# Patient Record
Sex: MALE | Race: OTHER | NOT HISPANIC OR LATINO
[De-identification: names, ages, dates, MRNs, and addresses within clinical notes are randomized per-mention and may not be internally consistent; named-entity substitution may affect disease eponyms.]

---

## 2021-10-12 ENCOUNTER — APPOINTMENT (OUTPATIENT)
Dept: UROLOGY | Facility: CLINIC | Age: 61
End: 2021-10-12
Payer: COMMERCIAL

## 2021-10-12 VITALS
WEIGHT: 163 LBS | SYSTOLIC BLOOD PRESSURE: 105 MMHG | DIASTOLIC BLOOD PRESSURE: 64 MMHG | OXYGEN SATURATION: 98 % | RESPIRATION RATE: 16 BRPM | HEART RATE: 87 BPM | BODY MASS INDEX: 27.49 KG/M2 | HEIGHT: 64.57 IN | TEMPERATURE: 97.7 F

## 2021-10-12 DIAGNOSIS — R59.0 LOCALIZED ENLARGED LYMPH NODES: ICD-10-CM

## 2021-10-12 DIAGNOSIS — Z87.891 PERSONAL HISTORY OF NICOTINE DEPENDENCE: ICD-10-CM

## 2021-10-12 DIAGNOSIS — N13.5 CROSSING VESSEL AND STRICTURE OF URETER W/OUT HYDRONEPHROSIS: ICD-10-CM

## 2021-10-12 DIAGNOSIS — C34.92 MALIGNANT NEOPLASM OF UNSPECIFIED PART OF LEFT BRONCHUS OR LUNG: ICD-10-CM

## 2021-10-12 DIAGNOSIS — Z00.00 ENCOUNTER FOR GENERAL ADULT MEDICAL EXAMINATION W/OUT ABNORMAL FINDINGS: ICD-10-CM

## 2021-10-12 PROCEDURE — 99203 OFFICE O/P NEW LOW 30 MIN: CPT

## 2021-10-12 RX ORDER — OXYBUTYNIN CHLORIDE 5 MG/1
5 TABLET ORAL
Refills: 0 | Status: ACTIVE | COMMUNITY

## 2021-10-12 RX ORDER — SPIRONOLACTONE 25 MG/1
25 TABLET ORAL
Refills: 0 | Status: ACTIVE | COMMUNITY

## 2021-10-12 RX ORDER — LEVOTHYROXINE SODIUM 0.05 MG/1
50 TABLET ORAL
Refills: 0 | Status: ACTIVE | COMMUNITY

## 2021-10-12 RX ORDER — CRIZOTINIB 250 MG/1
250 CAPSULE ORAL
Refills: 0 | Status: ACTIVE | COMMUNITY

## 2021-10-12 RX ORDER — OLMESARTAN MEDOXOMIL 5 MG/1
5 TABLET, FILM COATED ORAL
Refills: 0 | Status: ACTIVE | COMMUNITY

## 2021-10-12 RX ORDER — PREDNISONE 20 MG/1
20 TABLET ORAL
Refills: 0 | Status: ACTIVE | COMMUNITY

## 2021-10-12 RX ORDER — TAMSULOSIN HYDROCHLORIDE 0.4 MG/1
0.4 CAPSULE ORAL
Refills: 0 | Status: ACTIVE | COMMUNITY

## 2021-10-12 NOTE — LETTER BODY
[FreeTextEntry1] : Otis Proctor MD\par [address] [place] [address] [address], \[place] [address]\par [phone]\par \par Dear Dr. Proctor, \par \par Reason for Visit: Left ureteral obstruction. Suprapubic edema. Proteinuria. \par \par This is a 61 year-old gentleman with a history of metastatic lung cancer presenting with proteinuria,  left ureteral obstruction and suprapubic edema. Patient is referred for evaluation of his condition. The patient previously developed left hydronephrosis secondary to a left ureteral obstruction. He subsequently underwent left stent placement with Dr. Paulino. The patient has progressive metastatic lung cancer. He complains of suprapubic edema.  His recent urinalysis demonstrated evidence of proteinuria. Patient denies any gross hematuria or dysuria or urinary incontinence. The patient denies any aggravating or relieving factors. The patient denies any interference of function. The patient is entirely asymptomatic. All other review of systems are negative. He has no cancer in his family medical history. He has previously undergone a lung biopsy. Past medical history, family history and social history were inquired and were noncontributory to current condition. The patient was a former smoker. He does not drink alcohol. Medications and allergies were reviewed. He has no known allergies to medication. \par \par On examination, the patient is a healthy-appearing gentleman in no acute distress. He is alert and oriented follows commands. He has normal mood and affect. He is normocephalic. Oral no thrush. Neck is supple. Respirations are unlabored. His abdomen is soft and nontender. Liver is nonpalpable. Bladder is nonpalpable. No CVA tenderness. Neurologically he is grossly intact. No peripheral edema. Skin without gross abnormality. He has normal male external genitalia. Normal meatus. Bilateral testes are descended intrascrotally and normal to palpation. On rectal examination, there is normal sphincter tone. The prostate is clinically benign without focal induration or nodularity.\par \par His urinalysis demonstrated evidence of proteinuria 1000 mg/mL. \par \par Assessment: Left ureteral obstruction. Suprapubic edema. Proteinuria. \par \par I counseled the patient. I discussed the various etiologies of his symptoms. In terms of his suprapubic edema, I discussed it is most likely secondary to  mediastinal lymphadenopathy from his lung cancer. In terms of his left ureteral obstruction,  I recommended the patient proceed with conservative management. In terms of his proteinuria, I recommended the patient undergo a 24 hour protein total urine test.  I encouraged the patient continue to follow up with Oncology. Patient understands that if he develops gross hematuria or any urinary discomfort, he will contact me for further evaluation. Risks and alternatives were discussed. I answered the patient questions. The patient will follow-up as directed and will contact me with any questions or concerns. Thank you for the opportunity to participate in the care of this patient. I'll keep you updated on his progress.\par \par Plan: 24 hour protein total.  Follow up as directed.

## 2021-10-12 NOTE — HISTORY OF PRESENT ILLNESS
[FreeTextEntry1] : Please refer to URO Consult note \par \par new male pt \par metastatic lung cancer \par left ureteral obstruction  \par left stent placement - dr. jacobs\par progressive cancer\par suprapubic edema \par discussed it is secondary from mediastinal lymphadenopathy from cancer \par conservative management \par see oncology \par

## 2021-10-12 NOTE — ADDENDUM
[FreeTextEntry1] : Entered by Krunal Ghosh, acting as scribe for Dr. Marbin Masters.\par \par The documentation recorded by the scribe accurately reflects the service I personally performed and the decisions made by me.

## 2022-06-28 ENCOUNTER — TRANSCRIPTION ENCOUNTER (OUTPATIENT)
Age: 62
End: 2022-06-28

## 2022-06-28 ENCOUNTER — INPATIENT (INPATIENT)
Facility: HOSPITAL | Age: 62
LOS: 2 days | Discharge: HOME HEALTH | DRG: 300 | End: 2022-07-01
Attending: INTERNAL MEDICINE | Admitting: INTERNAL MEDICINE
Payer: COMMERCIAL

## 2022-06-28 VITALS
OXYGEN SATURATION: 96 % | DIASTOLIC BLOOD PRESSURE: 80 MMHG | HEART RATE: 65 BPM | TEMPERATURE: 98 F | WEIGHT: 158.73 LBS | RESPIRATION RATE: 18 BRPM | HEIGHT: 66 IN | SYSTOLIC BLOOD PRESSURE: 125 MMHG

## 2022-06-28 DIAGNOSIS — R09.89 OTHER SPECIFIED SYMPTOMS AND SIGNS INVOLVING THE CIRCULATORY AND RESPIRATORY SYSTEMS: ICD-10-CM

## 2022-06-28 DIAGNOSIS — I82.409 ACUTE EMBOLISM AND THROMBOSIS OF UNSPECIFIED DEEP VEINS OF UNSPECIFIED LOWER EXTREMITY: ICD-10-CM

## 2022-06-28 LAB
ALBUMIN SERPL ELPH-MCNC: 3.1 G/DL — LOW (ref 3.3–5)
ALP SERPL-CCNC: 70 U/L — SIGNIFICANT CHANGE UP (ref 40–120)
ALT FLD-CCNC: 18 U/L — SIGNIFICANT CHANGE UP (ref 10–45)
ANION GAP SERPL CALC-SCNC: 13 MMOL/L — SIGNIFICANT CHANGE UP (ref 5–17)
APTT BLD: 29.3 SEC — SIGNIFICANT CHANGE UP (ref 27.5–35.5)
AST SERPL-CCNC: 32 U/L — SIGNIFICANT CHANGE UP (ref 10–40)
BASOPHILS # BLD AUTO: 0.04 K/UL — SIGNIFICANT CHANGE UP (ref 0–0.2)
BASOPHILS NFR BLD AUTO: 0.5 % — SIGNIFICANT CHANGE UP (ref 0–2)
BILIRUB SERPL-MCNC: 0.8 MG/DL — SIGNIFICANT CHANGE UP (ref 0.2–1.2)
BUN SERPL-MCNC: 22 MG/DL — SIGNIFICANT CHANGE UP (ref 7–23)
CALCIUM SERPL-MCNC: 9.2 MG/DL — SIGNIFICANT CHANGE UP (ref 8.4–10.5)
CHLORIDE SERPL-SCNC: 105 MMOL/L — SIGNIFICANT CHANGE UP (ref 96–108)
CO2 SERPL-SCNC: 21 MMOL/L — LOW (ref 22–31)
CREAT SERPL-MCNC: 0.94 MG/DL — SIGNIFICANT CHANGE UP (ref 0.5–1.3)
EGFR: 93 ML/MIN/1.73M2 — SIGNIFICANT CHANGE UP
EOSINOPHIL # BLD AUTO: 0.26 K/UL — SIGNIFICANT CHANGE UP (ref 0–0.5)
EOSINOPHIL NFR BLD AUTO: 3.4 % — SIGNIFICANT CHANGE UP (ref 0–6)
GLUCOSE SERPL-MCNC: 187 MG/DL — HIGH (ref 70–99)
HCT VFR BLD CALC: 50.3 % — HIGH (ref 39–50)
HGB BLD-MCNC: 15.9 G/DL — SIGNIFICANT CHANGE UP (ref 13–17)
IMM GRANULOCYTES NFR BLD AUTO: 0.9 % — SIGNIFICANT CHANGE UP (ref 0–1.5)
INR BLD: 1.09 RATIO — SIGNIFICANT CHANGE UP (ref 0.88–1.16)
LYMPHOCYTES # BLD AUTO: 1.36 K/UL — SIGNIFICANT CHANGE UP (ref 1–3.3)
LYMPHOCYTES # BLD AUTO: 18 % — SIGNIFICANT CHANGE UP (ref 13–44)
MCHC RBC-ENTMCNC: 29.3 PG — SIGNIFICANT CHANGE UP (ref 27–34)
MCHC RBC-ENTMCNC: 31.6 GM/DL — LOW (ref 32–36)
MCV RBC AUTO: 92.8 FL — SIGNIFICANT CHANGE UP (ref 80–100)
MONOCYTES # BLD AUTO: 0.5 K/UL — SIGNIFICANT CHANGE UP (ref 0–0.9)
MONOCYTES NFR BLD AUTO: 6.6 % — SIGNIFICANT CHANGE UP (ref 2–14)
NEUTROPHILS # BLD AUTO: 5.32 K/UL — SIGNIFICANT CHANGE UP (ref 1.8–7.4)
NEUTROPHILS NFR BLD AUTO: 70.6 % — SIGNIFICANT CHANGE UP (ref 43–77)
NRBC # BLD: 0 /100 WBCS — SIGNIFICANT CHANGE UP (ref 0–0)
PLATELET # BLD AUTO: 118 K/UL — LOW (ref 150–400)
POTASSIUM SERPL-MCNC: 4.5 MMOL/L — SIGNIFICANT CHANGE UP (ref 3.5–5.3)
POTASSIUM SERPL-SCNC: 4.5 MMOL/L — SIGNIFICANT CHANGE UP (ref 3.5–5.3)
PROT SERPL-MCNC: 6.2 G/DL — SIGNIFICANT CHANGE UP (ref 6–8.3)
PROTHROM AB SERPL-ACNC: 12.6 SEC — SIGNIFICANT CHANGE UP (ref 10.5–13.4)
RAPID RVP RESULT: SIGNIFICANT CHANGE UP
RBC # BLD: 5.42 M/UL — SIGNIFICANT CHANGE UP (ref 4.2–5.8)
RBC # FLD: 15.9 % — HIGH (ref 10.3–14.5)
SARS-COV-2 RNA SPEC QL NAA+PROBE: SIGNIFICANT CHANGE UP
SODIUM SERPL-SCNC: 139 MMOL/L — SIGNIFICANT CHANGE UP (ref 135–145)
WBC # BLD: 7.55 K/UL — SIGNIFICANT CHANGE UP (ref 3.8–10.5)
WBC # FLD AUTO: 7.55 K/UL — SIGNIFICANT CHANGE UP (ref 3.8–10.5)

## 2022-06-28 PROCEDURE — 74177 CT ABD & PELVIS W/CONTRAST: CPT | Mod: 26,MA

## 2022-06-28 PROCEDURE — 93926 LOWER EXTREMITY STUDY: CPT | Mod: 26,RT

## 2022-06-28 PROCEDURE — 73630 X-RAY EXAM OF FOOT: CPT | Mod: 26,RT

## 2022-06-28 PROCEDURE — 93010 ELECTROCARDIOGRAM REPORT: CPT | Mod: 59

## 2022-06-28 PROCEDURE — 99223 1ST HOSP IP/OBS HIGH 75: CPT

## 2022-06-28 PROCEDURE — 73590 X-RAY EXAM OF LOWER LEG: CPT | Mod: 26,RT

## 2022-06-28 PROCEDURE — 73610 X-RAY EXAM OF ANKLE: CPT | Mod: 26,RT

## 2022-06-28 PROCEDURE — 93971 EXTREMITY STUDY: CPT | Mod: 26,RT

## 2022-06-28 PROCEDURE — 99285 EMERGENCY DEPT VISIT HI MDM: CPT

## 2022-06-28 RX ORDER — MORPHINE SULFATE 50 MG/1
4 CAPSULE, EXTENDED RELEASE ORAL ONCE
Refills: 0 | Status: DISCONTINUED | OUTPATIENT
Start: 2022-06-28 | End: 2022-06-28

## 2022-06-28 RX ORDER — HEPARIN SODIUM 5000 [USP'U]/ML
6000 INJECTION INTRAVENOUS; SUBCUTANEOUS EVERY 6 HOURS
Refills: 0 | Status: DISCONTINUED | OUTPATIENT
Start: 2022-06-28 | End: 2022-06-29

## 2022-06-28 RX ORDER — ONDANSETRON 8 MG/1
4 TABLET, FILM COATED ORAL ONCE
Refills: 0 | Status: COMPLETED | OUTPATIENT
Start: 2022-06-28 | End: 2022-06-28

## 2022-06-28 RX ORDER — HEPARIN SODIUM 5000 [USP'U]/ML
6000 INJECTION INTRAVENOUS; SUBCUTANEOUS ONCE
Refills: 0 | Status: COMPLETED | OUTPATIENT
Start: 2022-06-28 | End: 2022-06-28

## 2022-06-28 RX ORDER — HEPARIN SODIUM 5000 [USP'U]/ML
INJECTION INTRAVENOUS; SUBCUTANEOUS
Qty: 25000 | Refills: 0 | Status: DISCONTINUED | OUTPATIENT
Start: 2022-06-28 | End: 2022-06-29

## 2022-06-28 RX ORDER — HEPARIN SODIUM 5000 [USP'U]/ML
3000 INJECTION INTRAVENOUS; SUBCUTANEOUS EVERY 6 HOURS
Refills: 0 | Status: DISCONTINUED | OUTPATIENT
Start: 2022-06-28 | End: 2022-06-29

## 2022-06-28 RX ADMIN — MORPHINE SULFATE 4 MILLIGRAM(S): 50 CAPSULE, EXTENDED RELEASE ORAL at 18:52

## 2022-06-28 RX ADMIN — HEPARIN SODIUM 1300 UNIT(S)/HR: 5000 INJECTION INTRAVENOUS; SUBCUTANEOUS at 20:13

## 2022-06-28 RX ADMIN — ONDANSETRON 4 MILLIGRAM(S): 8 TABLET, FILM COATED ORAL at 18:53

## 2022-06-28 RX ADMIN — HEPARIN SODIUM 6000 UNIT(S): 5000 INJECTION INTRAVENOUS; SUBCUTANEOUS at 20:13

## 2022-06-28 NOTE — H&P ADULT - ASSESSMENT
62M w/ pmhx of MET amplified/mutation adenocarcinoma of the lung, mediastinal and abdominopelvic LNs, pericardium, pleura, s/p multiple lines of therapy, currently on cabozantinib 40mg PO daily, DM2, HLD, HTN, hx of DVT p/w severe RLE edema, skin desquamation and skin color change found to have large extensive RLE DVT

## 2022-06-28 NOTE — ED PROVIDER NOTE - NS ED ATTENDING STATEMENT MOD
This was a shared visit with the SAMY. I reviewed and verified the documentation and independently performed the documented:

## 2022-06-28 NOTE — ED CLERICAL - NS ED CLERK NOTE PRE-ARRIVAL INFOMATION; PCP NAME
Dwaine TAVAREZ from [facility] [place] calling. On call is Dr. Anthony

## 2022-06-28 NOTE — ED PROVIDER NOTE - OBJECTIVE STATEMENT
62 y.o. male with PMHx of HTN, HLD, DM, and metastatic lung adenocarcinoma presents to the ED with c/o of 62 y.o. male with PMHx of HTN, HLD, DM, and metastatic lung adenocarcinoma (tx at [facility] by Sha Anthony) presents to the ED with c/o of R. lower extremity edema and pain x 3 weeks.  R. LE edema has become progressively worse over past few weeks.  Yesterday, swelling and pain became so severe that he could not ambulate independently when he typically could.  He states that his toenails were a black color yesterday but are normal in color today.  Also endorses nausea and vomiting r/t his medication regimen for cancer.  He denies any fever, chills, syncope, diarrhea, or trauma/injury to the area.

## 2022-06-28 NOTE — H&P ADULT - PROBLEM SELECTOR PLAN 3
MET amplified/mutation adenocarcinoma of the lung, mediastinal and abdominopelvic LNs, pericardium, pleura, s/p multiple lines of therapy, currently on cabozantinib 40mg PO daily. Appreciate hem/onc recommendations  -Hold Cabozantinib for now  -F/u hem/onc recommendations MET amplified/mutation adenocarcinoma of the lung, mediastinal and abdominopelvic LNs, pericardium, pleura, s/p multiple lines of therapy, currently on cabozantinib 40mg PO daily. Appreciate hem/onc recommendations  -Hold Cabozantinib for now  -F/u hem/onc recommendations  -Suspect pt's abd pain related to peritoneal carcinomatosis and possible bladder mets

## 2022-06-28 NOTE — CONSULT NOTE ADULT - ASSESSMENT
Patient under care at [facility] Dr. Sha Anthony for metastatic  MET amplified/mutation adenocarcinoma of the lung (originally diagnosed in 2013), mediastinal and abdominopelvic LNs, pericardium, pleura, s/p multiple lines of therapy, currently on cabozantinib 40mg PO daily referred to [facility] ED with severe right foot pain and swelling which started about 3 weeks ago now with inability to walk on it. Patient was given Dilaudid 1 mg IV at [facility] before being sent to ED. Patient states that his toes were a black color yesterday, but have improved in color today. He has numerous ulcers on his right foot. Patient denies fevers, chills, lightheadedness, dizziness, SOB, chest pain, N/V/D/C. Patient also has swelling and skin changes to left foot as well as right hand, but not as severe as on R foot.    Of note patient started cabozantinib about 1 week prior to foot and skin changes.    Metastatic adenocarcinoma of lung  --Under care by Dr. Sha Anthony of [facility]  --Currently on cabozantanib - would hold during admission  --Ongoing management after discharge    Painful RLE edema  --Patient with bilateral skin changes, desquamation on soles of feet but R>>L  --Also some skin breakdown palm of right hand  --Hand/foot syndrome is a known AE associated with cabozantinib  --Please obtain bilateral dopplers to rule out DVT's as well as arterial occlusion  --Patient does have a PMH of RLE DVT as well as a PMH of a LN groin dissection for   --Also please assess for possible cellulitis  --Pain management as needed    We will continue to follow patient and coordinate with [facility].    Ismael Liz PA-C  Hematology/Oncology  [facility]   [phone] (office)  [phone] (alt office)  Evenings and weekends please call MD on call or office   Patient under care at [facility] Dr. Sha Anthony for metastatic  MET amplified/mutation adenocarcinoma of the lung (originally diagnosed in 2013), mediastinal and abdominopelvic LNs, pericardium, pleura, s/p multiple lines of therapy, currently on cabozantinib 40mg PO daily referred to [facility] ED with severe right foot pain and swelling which started about 3 weeks ago now with inability to walk on it. Patient was given Dilaudid 1 mg IV at [facility] before being sent to ED. Patient states that his toes were a black color yesterday, but have improved in color today. He has numerous ulcers on his right foot. Patient denies fevers, chills, lightheadedness, dizziness, SOB, chest pain, N/V/D/C. Patient also has swelling and skin changes to left foot as well as right hand, but not as severe as on R foot.    Of note patient started cabozantinib about 1 week prior to foot and skin changes.    Metastatic adenocarcinoma of lung  --Under care by Dr. Sha Anthony of [facility]  --Currently on cabozantanib - would hold during admission  --Ongoing management after discharge    Painful RLE edema  --Patient with bilateral skin changes, desquamation on soles of feet but R>>L  --Also some skin breakdown palm of right hand  --Hand/foot syndrome is a known AE associated with cabozantinib  --Please obtain bilateral dopplers to rule out DVT's as well as arterial occlusion  --Patient does have a PMH of RLE DVT as well as a PMH of a LN groin dissection for   --Also please assess for possible cellulitis - may need ID, vascular, Derm consults  --Pain management as needed    We will continue to follow patient and coordinate with [facility].    Ismael Liz PA-C  Hematology/Oncology  [facility]   [phone] (office)  [phone] (alt office)  Evenings and weekends please call MD on call or office

## 2022-06-28 NOTE — H&P ADULT - PROBLEM SELECTOR PLAN 1
Extensive RLE DVT noted Extensive RLE DVT noted on dopplers. Soft tissue mass abutting the right common iliac likely contributing. Metastatic malignancy as well.  -Cont. heparin gtt  -Trend PTT  -Pain control, will cont. IV morphine for now  -Consider vascular consult in AM given extensive clot and inciting abutment  -Transition to DOAC when stable/ insurance coverage

## 2022-06-28 NOTE — ED PROVIDER NOTE - PHYSICAL EXAMINATION
GEN: Pt in NAD, A&O x4.  PSYCH: Affect appropriate.  HEENT: Head NCAT. eyes sclera white w/o injection. Neck supple FROM.  RESP: CTA b/l  CARDIAC: RRR, clear distinct S1, S2, no appreciable murmurs.  ABD: Abdomen soft, non-tender. No CVAT b/l.  VASC: R. LE erythema, edema - b/l lower extremity skin changes.  Sensation equal and intact in b/l lower extremities.

## 2022-06-28 NOTE — H&P ADULT - NSHPPHYSICALEXAM_GEN_ALL_CORE
Vital Signs Last 24 Hrs  T(C): 36.4 (06-28-22 @ 14:09), Max: 36.6 (06-28-22 @ 13:33)  T(F): 97.6 (06-28-22 @ 14:09), Max: 97.8 (06-28-22 @ 13:33)  HR: 70 (06-28-22 @ 20:28) (65 - 70)  BP: 115/76 (06-28-22 @ 20:28) (115/76 - 134/88)  BP(mean): --  RR: 16 (06-28-22 @ 20:28) (16 - 18)  SpO2: 100% (06-28-22 @ 20:28) (96% - 100%)

## 2022-06-28 NOTE — ED PROVIDER NOTE - CARE PLAN
1 Principal Discharge DX:	DVT, lower extremity   Principal Discharge DX:	Deep vein thrombosis of right lower extremity  Secondary Diagnosis:	Stage 4 lung cancer  Secondary Diagnosis:	Desquamated skin

## 2022-06-28 NOTE — H&P ADULT - NSHPADDITIONALINFOADULT_GEN_ALL_CORE
I was asked to see this patient by the hospitalist in charge. Dr. Damian to assume care for patient in AM and thereafter

## 2022-06-28 NOTE — H&P ADULT - HISTORY OF PRESENT ILLNESS
62M w/ pmhx of MET amplified/mutation adenocarcinoma of the lung, mediastinal and abdominopelvic LNs, pericardium, pleura, s/p multiple lines of therapy, currently on cabozantinib 40mg PO daily, DM2, HLD, HTN, hx of DVT p/w severe right foot pain and swelling which started about 3 weeks ago now with inability to walk on it. Patient was given Dilaudid 1 mg IV at [facility] before being sent to ED. Patient states that his toes were a black color yesterday and prompted visit to seek medical care but have improved in color today. He has numerous ulcers on his right foot. Patient denies fevers, chills, lightheadedness, dizziness, SOB, chest pain, N/V/D/C. Patient also has swelling and skin changes to left foot as well as right hand, but not as severe as on R foot.    Of note patient started cabozantinib about 1 week prior to foot and skin changes.    In ER: Given Heparin gtt, morphine 4mg IV, zofran 4mg IV

## 2022-06-28 NOTE — ED ADULT NURSE NOTE - ED STAT RN HANDOFF DETAILS
Report given to receiving change of shift JUAN Carlos patient is in no acute distress. Patient vital signs stable, plan of care explained.

## 2022-06-28 NOTE — ED CLERICAL - NS ED CLERK NOTE PRE-ARRIVAL INFORMATION; ADDITIONAL PRE-ARRIVAL INFORMATION
CC/Reason For referral: right foot swelling and pain.  Hx Lung Cancer and taking Cabozatinib PO  Preferred Consultant(if applicable):  Who admits for you (if needed):  Do you have documents you would like to fax over?  Would you still like to speak to an ED attending? No

## 2022-06-28 NOTE — CHART NOTE - NSCHARTNOTEFT_GEN_A_CORE
Patient under care at [facility] for metastatic lung cancer currently on cabozatinib 40mg PO daily referred to [facility] ED with severe right foot pain and swelling with inability to walk on it. Patient was given Dilaudid 1 mg IV at [facility] before being sent to ED. Patient will need RLE doppler to rule out clot vs severe hand/foot syndrome from meds.    Records being sent from [facility]. Full consult to follow once assessed.    If admitted to medicine please admit to Dr. Jaswinder Damian who is aware of patient.    Thank you.  Ismael Liz PA-C  Hematology/Oncology  [facility]   [phone] (office)  [phone] (alt office)  Evenings and weekends please call MD on call or office

## 2022-06-28 NOTE — H&P ADULT - PROBLEM SELECTOR PLAN 6
-Trend BP  -Cont. torsemide BID for now -Trend BP  -Cont. torsemide BID for now  -On propranolol at home

## 2022-06-28 NOTE — H&P ADULT - PROBLEM SELECTOR PLAN 2
Thought to be possible side effect to cabozantinib  -Cont. to hold cabozantinib for now  -F/u hem/onc recommendations

## 2022-06-28 NOTE — ED PROVIDER NOTE - ATTENDING APP SHARED VISIT CONTRIBUTION OF CARE
Attending Statement (JADE Hummel MD):    HPI: 61 y/o M with h/o metastatic lung adenocarcinoma, on chemotherapy, as well as h/o HTN HLD DM, presents to the ED for evaluation of right leg swelling pain and ulcerations for the past 3 weeks.  Has had ulcers (described as flaking off of skin) in both feet with his chemotherapy but RLE is worse, and 3 weeks ago developed increased swelling in the right leg; also notes that intermittently his toenails turn black, but is not having this symptoms at present. No numbness.     Review of Systems:  -General: no fever  -ENT: no congestion, no difficulty swallowing  -Pulmonary: no cough, no shortness of breath  -Cardiac: no chest pain, no palpitations  -Gastrointestinal: no abdominal pain, no nausea, no vomiting, and no diarrhea.  -Genitourinary: no blood or pain with urination  -Musculoskeletal: no back or neck pain  -Skin: no rashes  -Endocrine: +h/o diabetes  -Neurologic: No new weakness or numbness in extremities    All else negative unless otherwise specified elsewhere in this note.    PSH/PMH as noted above    On Physical Exam:  General: well appearing, in NAD, speaking clearly in full sentences and without difficulty; cooperative with exam  HEENT: anicteric sclera, airway patent  Neck: no JVD  Cardiac: s1s2  Lungs: CTABL  Abdomen: soft nontender/nondistended  : no bladder tenderness or distension  Skin: intact, no rash  Extremities: b/l desquamation in both feet on plantar surface, no jamilah ulcerations or areas of necrotic tissue.  RLE with diffuse pitting edema and discoloration (darkening/hyperpigmentation) of skin, but not cyanotic. b/l LE warm/well-perfused, dp/pt pulses palpable b/l, sensation intact to touch in both LE, and soft compartments are present  Neuro: no gross neurologic deficits    MDM: RLE with venous stasis changes and b/l LE with desquamation (likely 2/2 chemotx); not overtly consistent with cellulitis/infection on my exam; will need screening labs: cbc (to evaluate for leukocytosis or anemia), CMP (to evaluate for electrolyte abnormalities or renal/liver dysfunction) and coag panel, and Ultrasound of the RLE to evaluate for DVT; if DVT present, suspect need for admission given complex medical comorbidities to start AC.

## 2022-06-28 NOTE — ED ADULT TRIAGE NOTE - CHIEF COMPLAINT QUOTE
RLE swelling and pain, sent from [facility] with 20 g IV  present in R. forearm with mediport to left chest- not currently accessed

## 2022-06-28 NOTE — H&P ADULT - NSICDXPASTMEDICALHX_GEN_ALL_CORE_FT
PAST MEDICAL HISTORY:  DM (diabetes mellitus)     HLD (hyperlipidemia)     HTN (hypertension)      PAST MEDICAL HISTORY:  DM (diabetes mellitus)     DVT, lower extremity     HLD (hyperlipidemia)     HTN (hypertension)     Stage 4 lung cancer

## 2022-06-28 NOTE — ED ADULT NURSE NOTE - OBJECTIVE STATEMENT
61yo M aaox4 h/o Dm2, metastatic lung CA , [place] [facility] c/o R leg swelling/pain, as per pt about 2 weeks started with he swelling, worsening yesterday , pt reports that cant walk for the pain, pain is on the R calf, pt reports that he has taking chemo Po  medication, but Md stopped the medications for the side effects  . [place] [facility] [place] 20[place]G on the R AC, On examinations pt have Mediport on the L upper chest as pe rpt " Its not working" , also c/o burning urinations for 2 months , getting better lately. Pt denies CP, SOB, HA, vision changes, n/v/d, fevers chills, abdominal pain. Safety and comfort measures initiated- bed placed in lowest position and side rails raised. Pt oriented to call bell system.

## 2022-06-29 DIAGNOSIS — Z29.9 ENCOUNTER FOR PROPHYLACTIC MEASURES, UNSPECIFIED: ICD-10-CM

## 2022-06-29 DIAGNOSIS — R23.4 CHANGES IN SKIN TEXTURE: ICD-10-CM

## 2022-06-29 DIAGNOSIS — I10 ESSENTIAL (PRIMARY) HYPERTENSION: ICD-10-CM

## 2022-06-29 DIAGNOSIS — E11.9 TYPE 2 DIABETES MELLITUS WITHOUT COMPLICATIONS: ICD-10-CM

## 2022-06-29 DIAGNOSIS — C34.90 MALIGNANT NEOPLASM OF UNSPECIFIED PART OF UNSPECIFIED BRONCHUS OR LUNG: ICD-10-CM

## 2022-06-29 DIAGNOSIS — I82.409 ACUTE EMBOLISM AND THROMBOSIS OF UNSPECIFIED DEEP VEINS OF UNSPECIFIED LOWER EXTREMITY: ICD-10-CM

## 2022-06-29 DIAGNOSIS — E78.5 HYPERLIPIDEMIA, UNSPECIFIED: ICD-10-CM

## 2022-06-29 LAB
A1C WITH ESTIMATED AVERAGE GLUCOSE RESULT: 7.5 % — HIGH (ref 4–5.6)
ALBUMIN SERPL ELPH-MCNC: 2.9 G/DL — LOW (ref 3.3–5)
ALP SERPL-CCNC: 60 U/L — SIGNIFICANT CHANGE UP (ref 40–120)
ALT FLD-CCNC: 15 U/L — SIGNIFICANT CHANGE UP (ref 10–45)
ANION GAP SERPL CALC-SCNC: 12 MMOL/L — SIGNIFICANT CHANGE UP (ref 5–17)
APTT BLD: 157.6 SEC — CRITICAL HIGH (ref 27.5–35.5)
APTT BLD: 89.3 SEC — HIGH (ref 27.5–35.5)
APTT BLD: >200 SEC — CRITICAL HIGH (ref 27.5–35.5)
AST SERPL-CCNC: 25 U/L — SIGNIFICANT CHANGE UP (ref 10–40)
BASOPHILS # BLD AUTO: 0.03 K/UL — SIGNIFICANT CHANGE UP (ref 0–0.2)
BASOPHILS NFR BLD AUTO: 0.5 % — SIGNIFICANT CHANGE UP (ref 0–2)
BILIRUB SERPL-MCNC: 0.5 MG/DL — SIGNIFICANT CHANGE UP (ref 0.2–1.2)
BUN SERPL-MCNC: 24 MG/DL — HIGH (ref 7–23)
CALCIUM SERPL-MCNC: 8.8 MG/DL — SIGNIFICANT CHANGE UP (ref 8.4–10.5)
CHLORIDE SERPL-SCNC: 103 MMOL/L — SIGNIFICANT CHANGE UP (ref 96–108)
CO2 SERPL-SCNC: 23 MMOL/L — SIGNIFICANT CHANGE UP (ref 22–31)
CREAT SERPL-MCNC: 1.11 MG/DL — SIGNIFICANT CHANGE UP (ref 0.5–1.3)
EGFR: 76 ML/MIN/1.73M2 — SIGNIFICANT CHANGE UP
EOSINOPHIL # BLD AUTO: 0.12 K/UL — SIGNIFICANT CHANGE UP (ref 0–0.5)
EOSINOPHIL NFR BLD AUTO: 2.1 % — SIGNIFICANT CHANGE UP (ref 0–6)
ESTIMATED AVERAGE GLUCOSE: 169 MG/DL — HIGH (ref 68–114)
GLUCOSE BLDC GLUCOMTR-MCNC: 157 MG/DL — HIGH (ref 70–99)
GLUCOSE BLDC GLUCOMTR-MCNC: 176 MG/DL — HIGH (ref 70–99)
GLUCOSE BLDC GLUCOMTR-MCNC: 177 MG/DL — HIGH (ref 70–99)
GLUCOSE BLDC GLUCOMTR-MCNC: 190 MG/DL — HIGH (ref 70–99)
GLUCOSE BLDC GLUCOMTR-MCNC: 249 MG/DL — HIGH (ref 70–99)
GLUCOSE SERPL-MCNC: 272 MG/DL — HIGH (ref 70–99)
HCT VFR BLD CALC: 45.5 % — SIGNIFICANT CHANGE UP (ref 39–50)
HCT VFR BLD CALC: 47.9 % — SIGNIFICANT CHANGE UP (ref 39–50)
HGB BLD-MCNC: 14.7 G/DL — SIGNIFICANT CHANGE UP (ref 13–17)
HGB BLD-MCNC: 15.1 G/DL — SIGNIFICANT CHANGE UP (ref 13–17)
IMM GRANULOCYTES NFR BLD AUTO: 0.5 % — SIGNIFICANT CHANGE UP (ref 0–1.5)
LYMPHOCYTES # BLD AUTO: 1.48 K/UL — SIGNIFICANT CHANGE UP (ref 1–3.3)
LYMPHOCYTES # BLD AUTO: 26.3 % — SIGNIFICANT CHANGE UP (ref 13–44)
MAGNESIUM SERPL-MCNC: 1.9 MG/DL — SIGNIFICANT CHANGE UP (ref 1.6–2.6)
MCHC RBC-ENTMCNC: 29.2 PG — SIGNIFICANT CHANGE UP (ref 27–34)
MCHC RBC-ENTMCNC: 29.9 PG — SIGNIFICANT CHANGE UP (ref 27–34)
MCHC RBC-ENTMCNC: 31.5 GM/DL — LOW (ref 32–36)
MCHC RBC-ENTMCNC: 32.3 GM/DL — SIGNIFICANT CHANGE UP (ref 32–36)
MCV RBC AUTO: 92.5 FL — SIGNIFICANT CHANGE UP (ref 80–100)
MCV RBC AUTO: 92.7 FL — SIGNIFICANT CHANGE UP (ref 80–100)
MONOCYTES # BLD AUTO: 0.45 K/UL — SIGNIFICANT CHANGE UP (ref 0–0.9)
MONOCYTES NFR BLD AUTO: 8 % — SIGNIFICANT CHANGE UP (ref 2–14)
NEUTROPHILS # BLD AUTO: 3.51 K/UL — SIGNIFICANT CHANGE UP (ref 1.8–7.4)
NEUTROPHILS NFR BLD AUTO: 62.6 % — SIGNIFICANT CHANGE UP (ref 43–77)
NRBC # BLD: 0 /100 WBCS — SIGNIFICANT CHANGE UP (ref 0–0)
NRBC # BLD: 0 /100 WBCS — SIGNIFICANT CHANGE UP (ref 0–0)
PLATELET # BLD AUTO: 116 K/UL — LOW (ref 150–400)
PLATELET # BLD AUTO: 118 K/UL — LOW (ref 150–400)
POTASSIUM SERPL-MCNC: 3.8 MMOL/L — SIGNIFICANT CHANGE UP (ref 3.5–5.3)
POTASSIUM SERPL-SCNC: 3.8 MMOL/L — SIGNIFICANT CHANGE UP (ref 3.5–5.3)
PROT SERPL-MCNC: 5.7 G/DL — LOW (ref 6–8.3)
RBC # BLD: 4.91 M/UL — SIGNIFICANT CHANGE UP (ref 4.2–5.8)
RBC # BLD: 5.18 M/UL — SIGNIFICANT CHANGE UP (ref 4.2–5.8)
RBC # FLD: 15.8 % — HIGH (ref 10.3–14.5)
RBC # FLD: 15.9 % — HIGH (ref 10.3–14.5)
SODIUM SERPL-SCNC: 138 MMOL/L — SIGNIFICANT CHANGE UP (ref 135–145)
WBC # BLD: 5.62 K/UL — SIGNIFICANT CHANGE UP (ref 3.8–10.5)
WBC # BLD: 6.29 K/UL — SIGNIFICANT CHANGE UP (ref 3.8–10.5)
WBC # FLD AUTO: 5.62 K/UL — SIGNIFICANT CHANGE UP (ref 3.8–10.5)
WBC # FLD AUTO: 6.29 K/UL — SIGNIFICANT CHANGE UP (ref 3.8–10.5)

## 2022-06-29 PROCEDURE — 93010 ELECTROCARDIOGRAM REPORT: CPT

## 2022-06-29 RX ORDER — DEXTROSE 50 % IN WATER 50 %
25 SYRINGE (ML) INTRAVENOUS ONCE
Refills: 0 | Status: DISCONTINUED | OUTPATIENT
Start: 2022-06-29 | End: 2022-07-01

## 2022-06-29 RX ORDER — METFORMIN HYDROCHLORIDE 850 MG/1
4 TABLET ORAL
Qty: 0 | Refills: 0 | DISCHARGE

## 2022-06-29 RX ORDER — PANTOPRAZOLE SODIUM 20 MG/1
40 TABLET, DELAYED RELEASE ORAL
Refills: 0 | Status: DISCONTINUED | OUTPATIENT
Start: 2022-06-29 | End: 2022-07-01

## 2022-06-29 RX ORDER — SODIUM CHLORIDE 9 MG/ML
1000 INJECTION, SOLUTION INTRAVENOUS
Refills: 0 | Status: DISCONTINUED | OUTPATIENT
Start: 2022-06-29 | End: 2022-07-01

## 2022-06-29 RX ORDER — ACETAMINOPHEN 500 MG
650 TABLET ORAL EVERY 6 HOURS
Refills: 0 | Status: DISCONTINUED | OUTPATIENT
Start: 2022-06-29 | End: 2022-07-01

## 2022-06-29 RX ORDER — ONDANSETRON 8 MG/1
4 TABLET, FILM COATED ORAL EVERY 8 HOURS
Refills: 0 | Status: DISCONTINUED | OUTPATIENT
Start: 2022-06-29 | End: 2022-07-01

## 2022-06-29 RX ORDER — FENOFIBRATE,MICRONIZED 130 MG
145 CAPSULE ORAL DAILY
Refills: 0 | Status: DISCONTINUED | OUTPATIENT
Start: 2022-06-29 | End: 2022-07-01

## 2022-06-29 RX ORDER — LOSARTAN POTASSIUM 100 MG/1
25 TABLET, FILM COATED ORAL DAILY
Refills: 0 | Status: DISCONTINUED | OUTPATIENT
Start: 2022-06-29 | End: 2022-07-01

## 2022-06-29 RX ORDER — ATORVASTATIN CALCIUM 80 MG/1
10 TABLET, FILM COATED ORAL AT BEDTIME
Refills: 0 | Status: DISCONTINUED | OUTPATIENT
Start: 2022-06-29 | End: 2022-07-01

## 2022-06-29 RX ORDER — LOSARTAN POTASSIUM 100 MG/1
1 TABLET, FILM COATED ORAL
Qty: 0 | Refills: 0 | DISCHARGE

## 2022-06-29 RX ORDER — OMEPRAZOLE 10 MG/1
1 CAPSULE, DELAYED RELEASE ORAL
Qty: 0 | Refills: 0 | DISCHARGE

## 2022-06-29 RX ORDER — HEPARIN SODIUM 5000 [USP'U]/ML
6000 INJECTION INTRAVENOUS; SUBCUTANEOUS EVERY 6 HOURS
Refills: 0 | Status: DISCONTINUED | OUTPATIENT
Start: 2022-06-29 | End: 2022-06-30

## 2022-06-29 RX ORDER — POLYETHYLENE GLYCOL 3350 17 G/17G
17 POWDER, FOR SOLUTION ORAL DAILY
Refills: 0 | Status: DISCONTINUED | OUTPATIENT
Start: 2022-06-29 | End: 2022-07-01

## 2022-06-29 RX ORDER — HEPARIN SODIUM 5000 [USP'U]/ML
1100 INJECTION INTRAVENOUS; SUBCUTANEOUS
Qty: 25000 | Refills: 0 | Status: DISCONTINUED | OUTPATIENT
Start: 2022-06-29 | End: 2022-06-30

## 2022-06-29 RX ORDER — ACETAMINOPHEN 500 MG
650 TABLET ORAL EVERY 6 HOURS
Refills: 0 | Status: DISCONTINUED | OUTPATIENT
Start: 2022-06-29 | End: 2022-06-29

## 2022-06-29 RX ORDER — LANOLIN ALCOHOL/MO/W.PET/CERES
3 CREAM (GRAM) TOPICAL AT BEDTIME
Refills: 0 | Status: DISCONTINUED | OUTPATIENT
Start: 2022-06-29 | End: 2022-07-01

## 2022-06-29 RX ORDER — FOLIC ACID 0.8 MG
1 TABLET ORAL DAILY
Refills: 0 | Status: DISCONTINUED | OUTPATIENT
Start: 2022-06-29 | End: 2022-07-01

## 2022-06-29 RX ORDER — GLUCAGON INJECTION, SOLUTION 0.5 MG/.1ML
1 INJECTION, SOLUTION SUBCUTANEOUS ONCE
Refills: 0 | Status: DISCONTINUED | OUTPATIENT
Start: 2022-06-29 | End: 2022-07-01

## 2022-06-29 RX ORDER — FOLIC ACID 0.8 MG
1 TABLET ORAL
Qty: 0 | Refills: 0 | DISCHARGE

## 2022-06-29 RX ORDER — FENOFIBRATE,MICRONIZED 130 MG
1 CAPSULE ORAL
Qty: 0 | Refills: 0 | DISCHARGE

## 2022-06-29 RX ORDER — MORPHINE SULFATE 50 MG/1
4 CAPSULE, EXTENDED RELEASE ORAL EVERY 6 HOURS
Refills: 0 | Status: DISCONTINUED | OUTPATIENT
Start: 2022-06-29 | End: 2022-07-01

## 2022-06-29 RX ORDER — LANOLIN ALCOHOL/MO/W.PET/CERES
5 CREAM (GRAM) TOPICAL AT BEDTIME
Refills: 0 | Status: DISCONTINUED | OUTPATIENT
Start: 2022-06-29 | End: 2022-07-01

## 2022-06-29 RX ORDER — SENNA PLUS 8.6 MG/1
2 TABLET ORAL AT BEDTIME
Refills: 0 | Status: DISCONTINUED | OUTPATIENT
Start: 2022-06-29 | End: 2022-07-01

## 2022-06-29 RX ORDER — INSULIN LISPRO 100/ML
VIAL (ML) SUBCUTANEOUS
Refills: 0 | Status: DISCONTINUED | OUTPATIENT
Start: 2022-06-29 | End: 2022-07-01

## 2022-06-29 RX ORDER — OXYCODONE HYDROCHLORIDE 5 MG/1
5 TABLET ORAL EVERY 6 HOURS
Refills: 0 | Status: DISCONTINUED | OUTPATIENT
Start: 2022-06-29 | End: 2022-07-01

## 2022-06-29 RX ORDER — DEXTROSE 50 % IN WATER 50 %
12.5 SYRINGE (ML) INTRAVENOUS ONCE
Refills: 0 | Status: DISCONTINUED | OUTPATIENT
Start: 2022-06-29 | End: 2022-07-01

## 2022-06-29 RX ORDER — DEXTROSE 50 % IN WATER 50 %
15 SYRINGE (ML) INTRAVENOUS ONCE
Refills: 0 | Status: DISCONTINUED | OUTPATIENT
Start: 2022-06-29 | End: 2022-07-01

## 2022-06-29 RX ORDER — HEPARIN SODIUM 5000 [USP'U]/ML
3000 INJECTION INTRAVENOUS; SUBCUTANEOUS EVERY 6 HOURS
Refills: 0 | Status: DISCONTINUED | OUTPATIENT
Start: 2022-06-29 | End: 2022-06-30

## 2022-06-29 RX ADMIN — MORPHINE SULFATE 4 MILLIGRAM(S): 50 CAPSULE, EXTENDED RELEASE ORAL at 01:03

## 2022-06-29 RX ADMIN — Medication 650 MILLIGRAM(S): at 06:00

## 2022-06-29 RX ADMIN — LOSARTAN POTASSIUM 25 MILLIGRAM(S): 100 TABLET, FILM COATED ORAL at 05:20

## 2022-06-29 RX ADMIN — Medication 145 MILLIGRAM(S): at 13:59

## 2022-06-29 RX ADMIN — Medication 1 MILLIGRAM(S): at 11:31

## 2022-06-29 RX ADMIN — OXYCODONE HYDROCHLORIDE 5 MILLIGRAM(S): 5 TABLET ORAL at 19:10

## 2022-06-29 RX ADMIN — HEPARIN SODIUM 0 UNIT(S)/HR: 5000 INJECTION INTRAVENOUS; SUBCUTANEOUS at 03:58

## 2022-06-29 RX ADMIN — HEPARIN SODIUM 1300 UNIT(S)/HR: 5000 INJECTION INTRAVENOUS; SUBCUTANEOUS at 01:03

## 2022-06-29 RX ADMIN — Medication 1: at 18:09

## 2022-06-29 RX ADMIN — ATORVASTATIN CALCIUM 10 MILLIGRAM(S): 80 TABLET, FILM COATED ORAL at 21:13

## 2022-06-29 RX ADMIN — HEPARIN SODIUM 900 UNIT(S)/HR: 5000 INJECTION INTRAVENOUS; SUBCUTANEOUS at 13:38

## 2022-06-29 RX ADMIN — HEPARIN SODIUM 0 UNIT(S)/HR: 5000 INJECTION INTRAVENOUS; SUBCUTANEOUS at 12:26

## 2022-06-29 RX ADMIN — Medication 650 MILLIGRAM(S): at 05:18

## 2022-06-29 RX ADMIN — Medication 5 MILLIGRAM(S): at 21:13

## 2022-06-29 RX ADMIN — OXYCODONE HYDROCHLORIDE 5 MILLIGRAM(S): 5 TABLET ORAL at 18:10

## 2022-06-29 RX ADMIN — PANTOPRAZOLE SODIUM 40 MILLIGRAM(S): 20 TABLET, DELAYED RELEASE ORAL at 05:19

## 2022-06-29 RX ADMIN — Medication 5 MILLIGRAM(S): at 01:03

## 2022-06-29 RX ADMIN — Medication 650 MILLIGRAM(S): at 19:10

## 2022-06-29 RX ADMIN — Medication 650 MILLIGRAM(S): at 12:30

## 2022-06-29 RX ADMIN — Medication 650 MILLIGRAM(S): at 11:31

## 2022-06-29 RX ADMIN — Medication 2: at 12:27

## 2022-06-29 RX ADMIN — Medication 20 MILLIGRAM(S): at 13:59

## 2022-06-29 RX ADMIN — HEPARIN SODIUM 1100 UNIT(S)/HR: 5000 INJECTION INTRAVENOUS; SUBCUTANEOUS at 05:15

## 2022-06-29 RX ADMIN — HEPARIN SODIUM 900 UNIT(S)/HR: 5000 INJECTION INTRAVENOUS; SUBCUTANEOUS at 20:27

## 2022-06-29 RX ADMIN — Medication 1: at 08:52

## 2022-06-29 RX ADMIN — MORPHINE SULFATE 4 MILLIGRAM(S): 50 CAPSULE, EXTENDED RELEASE ORAL at 01:20

## 2022-06-29 RX ADMIN — SENNA PLUS 2 TABLET(S): 8.6 TABLET ORAL at 21:13

## 2022-06-29 RX ADMIN — Medication 650 MILLIGRAM(S): at 18:10

## 2022-06-29 RX ADMIN — Medication 20 MILLIGRAM(S): at 05:19

## 2022-06-29 RX ADMIN — POLYETHYLENE GLYCOL 3350 17 GRAM(S): 17 POWDER, FOR SOLUTION ORAL at 11:31

## 2022-06-29 NOTE — CONSULT NOTE ADULT - SUBJECTIVE AND OBJECTIVE BOX
Reason for consult: Non-small cell lung cancer    HPI: Patient under care at [facility] Dr. Sha Anthony for metastatic  MET amplified/mutation adenocarcinoma of the lung, mediastinal and abdominopelvic LNs, pericardium, pleura, s/p multiple lines of therapy, currently on cabozantinib 40mg PO daily referred to [facility] ED with severe right foot pain and swelling which started about 3 weeks ago now with inability to walk on it. Patient was given Dilaudid 1 mg IV at [facility] before being sent to ED. Patient states that his toes were a black color yesterday, but have improved in color today. He has numerous ulcers on his right foot. Patient denies fevers, chills, lightheadedness, dizziness, SOB, chest pain, N/V/D/C. Patient also has swelling and skin changes to left foot as well as right hand, but not as severe as on R foot.    Of note patient started cabozantinib about 1 week prior to foot and skin changes.    Patient will need RLE doppler to rule out clot vs severe hand/foot syndrome from meds.        PAST MEDICAL & SURGICAL HISTORY:  HTN (hypertension)      HLD (hyperlipidemia)      DM (diabetes mellitus)          FAMILY HISTORY:      Alcohol Denied  Smoking: Nonsmoker  Drug Use: Denied  Marital Status:         Allergies    Celebrex (Rash)    Intolerances        MEDICATIONS  (STANDING):    MEDICATIONS  (PRN):      ROS  No fever, sweats, chills  No epistaxis, HA, sore throat  No CP, SOB, cough, sputum  No n/v/d, abd pain, melena, hematochezia  No anxiety  Pain, skin changes both feet R>L per HPI  No bleeding, bruising  No dysuria, hematuria    T(C): 36.4 (06-28-22 @ 14:09), Max: 36.6 (06-28-22 @ 13:33)  HR: 65 (06-28-22 @ 14:09) (65 - 65)  BP: 132/98 (06-28-22 @ 14:09) (125/80 - 132/98)  RR: 18 (06-28-22 @ 14:09) (18 - 18)  SpO2: 97% (06-28-22 @ 14:09) (96% - 97%)  Wt(kg): --    PE  NAD  Awake, alert  Anicteric, MMM  RRR  CTAB  Abd soft, NT, ND  Bilateral pedal edema R>L  RLE slightly cool to touch  Rash, skin desquamation both feet at soles and palm of right hand                
HPI:  62M w/ pmhx of MET amplified/mutation adenocarcinoma of the lung, mediastinal and abdominopelvic LNs, pericardium, pleura, s/p multiple lines of therapy, currently on cabozantinib 40mg PO daily, DM2, HLD, HTN, hx of DVT p/w severe right foot pain and swelling which started about 3 weeks ago now with inability to walk on it. Patient was given Dilaudid 1 mg IV at [facility] before being sent to ED. Patient states that his toes were a black color yesterday and prompted visit to seek medical care but have improved in color today. He has numerous ulcers on his right foot. Patient denies fevers, chills, lightheadedness, dizziness, SOB, chest pain, N/V/D/C. Patient also has swelling and skin changes to left foot as well as right hand, but not as severe as on R foot.    Of note patient started cabozantinib about 1 week prior to foot and skin changes.    In ER: Given Heparin gtt, morphine 4mg IV, zofran 4mg IV (28 Jun 2022 23:50)    Patient is a 62y old  Male who presents with a chief complaint of RLE pain and edema (29 Jun 2022 14:30)    Allergies    Celebrex (Rash)    Intolerances      Vital Signs Last 24 Hrs  T(C): 36.4 (29 Jun 2022 14:21), Max: 36.8 (29 Jun 2022 00:11)  T(F): 97.6 (29 Jun 2022 14:21), Max: 98.3 (29 Jun 2022 00:11)  HR: 66 (29 Jun 2022 14:21) (66 - 75)  BP: 112/74 (29 Jun 2022 14:21) (102/70 - 135/85)  BP(mean): --  RR: 18 (29 Jun 2022 14:21) (16 - 18)  SpO2: 99% (29 Jun 2022 14:21) (98% - 100%)                        14.7   5.62  )-----------( 118      ( 29 Jun 2022 11:25 )             45.5     06-29    138  |  103  |  24<H>  ----------------------------<  272<H>  3.8   |  23  |  1.11    Ca    8.8      29 Jun 2022 11:26  Mg     1.9     06-29    TPro  5.7<L>  /  Alb  2.9<L>  /  TBili  0.5  /  DBili  x   /  AST  25  /  ALT  15  /  AlkPhos  60  06-29    CAPILLARY BLOOD GLUCOSE      POCT Blood Glucose.: 249 mg/dL (29 Jun 2022 12:21)    MEDICATIONS  (STANDING):  acetaminophen     Tablet .. 650 milliGRAM(s) Oral every 6 hours  atorvastatin 10 milliGRAM(s) Oral at bedtime  dextrose 5%. 1000 milliLiter(s) (100 mL/Hr) IV Continuous <Continuous>  dextrose 5%. 1000 milliLiter(s) (50 mL/Hr) IV Continuous <Continuous>  dextrose 50% Injectable 25 Gram(s) IV Push once  dextrose 50% Injectable 12.5 Gram(s) IV Push once  dextrose 50% Injectable 25 Gram(s) IV Push once  fenofibrate Tablet 145 milliGRAM(s) Oral daily  folic acid 1 milliGRAM(s) Oral daily  glucagon  Injectable 1 milliGRAM(s) IntraMuscular once  heparin  Infusion. 1100 Unit(s)/Hr (11 mL/Hr) IV Continuous <Continuous>  insulin lispro (ADMELOG) corrective regimen sliding scale   SubCutaneous three times a day before meals  losartan 25 milliGRAM(s) Oral daily  melatonin 5 milliGRAM(s) Oral at bedtime  pantoprazole    Tablet 40 milliGRAM(s) Oral before breakfast  polyethylene glycol 3350 17 Gram(s) Oral daily  senna 2 Tablet(s) Oral at bedtime  torsemide 20 milliGRAM(s) Oral two times a day    MEDICATIONS  (PRN):  dextrose Oral Gel 15 Gram(s) Oral once PRN Blood Glucose LESS THAN 70 milliGRAM(s)/deciliter  heparin   Injectable 6000 Unit(s) IV Push every 6 hours PRN For aPTT less than 40  heparin   Injectable 3000 Unit(s) IV Push every 6 hours PRN For aPTT between 40 - 57  melatonin 3 milliGRAM(s) Oral at bedtime PRN Insomnia  morphine  - Injectable 4 milliGRAM(s) IV Push every 6 hours PRN Severe Pain (7 - 10)  ondansetron Injectable 4 milliGRAM(s) IV Push every 8 hours PRN Nausea and/or Vomiting  oxyCODONE    IR 5 milliGRAM(s) Oral every 6 hours PRN Moderate Pain (4 - 6)    PAST MEDICAL & SURGICAL HISTORY:  HTN (hypertension)      HLD (hyperlipidemia)      DM (diabetes mellitus)      Stage 4 lung cancer      DVT, lower extremity      No significant past surgical history      BRETT:    Pedal pulses palp 2/4 bl, cap fill instant to all toes    Redness and scaling lesions in a moccasin like distribution POP to lateral lesions.  No drainage.  No open lesions.  No purulence or local signs of infection.     No gross deformities    +edema, RLE, POP R calf

## 2022-06-29 NOTE — PATIENT PROFILE ADULT - FALL HARM RISK - HARM RISK INTERVENTIONS

## 2022-06-29 NOTE — PROVIDER CONTACT NOTE (CRITICAL VALUE NOTIFICATION) - ASSESSMENT
pt alert & oriented
pt A&Ox4, no acute distress noted, v/s stable, afebrile, denies any pain, no acute bleeding noted

## 2022-06-29 NOTE — CONSULT NOTE ADULT - ASSESSMENT
62 year old male with RLE DVT and tinea pedis, bl feet  -pt evlauated, chart reviewed  -med management for DVT  -Rx lotrisone cream to apply twice daily to skin lesions, right and left foot  -Pod stable for DC once stable per medicine  -fu within 2 weeks of DC    Call for appointment   [phone]

## 2022-06-30 ENCOUNTER — TRANSCRIPTION ENCOUNTER (OUTPATIENT)
Age: 62
End: 2022-06-30

## 2022-06-30 LAB
APTT BLD: 103.9 SEC — HIGH (ref 27.5–35.5)
APTT BLD: 67.9 SEC — HIGH (ref 27.5–35.5)
GLUCOSE BLDC GLUCOMTR-MCNC: 138 MG/DL — HIGH (ref 70–99)
GLUCOSE BLDC GLUCOMTR-MCNC: 157 MG/DL — HIGH (ref 70–99)
GLUCOSE BLDC GLUCOMTR-MCNC: 202 MG/DL — HIGH (ref 70–99)
GLUCOSE BLDC GLUCOMTR-MCNC: 203 MG/DL — HIGH (ref 70–99)
HCT VFR BLD CALC: 46.3 % — SIGNIFICANT CHANGE UP (ref 39–50)
HCT VFR BLD CALC: 46.9 % — SIGNIFICANT CHANGE UP (ref 39–50)
HGB BLD-MCNC: 14.8 G/DL — SIGNIFICANT CHANGE UP (ref 13–17)
HGB BLD-MCNC: 15.1 G/DL — SIGNIFICANT CHANGE UP (ref 13–17)
MCHC RBC-ENTMCNC: 29.5 PG — SIGNIFICANT CHANGE UP (ref 27–34)
MCHC RBC-ENTMCNC: 30 PG — SIGNIFICANT CHANGE UP (ref 27–34)
MCHC RBC-ENTMCNC: 32 GM/DL — SIGNIFICANT CHANGE UP (ref 32–36)
MCHC RBC-ENTMCNC: 32.2 GM/DL — SIGNIFICANT CHANGE UP (ref 32–36)
MCV RBC AUTO: 91.8 FL — SIGNIFICANT CHANGE UP (ref 80–100)
MCV RBC AUTO: 93.7 FL — SIGNIFICANT CHANGE UP (ref 80–100)
NRBC # BLD: 0 /100 WBCS — SIGNIFICANT CHANGE UP (ref 0–0)
NRBC # BLD: 0 /100 WBCS — SIGNIFICANT CHANGE UP (ref 0–0)
PLATELET # BLD AUTO: 117 K/UL — LOW (ref 150–400)
PLATELET # BLD AUTO: 140 K/UL — LOW (ref 150–400)
RBC # BLD: 4.94 M/UL — SIGNIFICANT CHANGE UP (ref 4.2–5.8)
RBC # BLD: 5.11 M/UL — SIGNIFICANT CHANGE UP (ref 4.2–5.8)
RBC # FLD: 15.9 % — HIGH (ref 10.3–14.5)
RBC # FLD: 15.9 % — HIGH (ref 10.3–14.5)
WBC # BLD: 5.16 K/UL — SIGNIFICANT CHANGE UP (ref 3.8–10.5)
WBC # BLD: 5.83 K/UL — SIGNIFICANT CHANGE UP (ref 3.8–10.5)
WBC # FLD AUTO: 5.16 K/UL — SIGNIFICANT CHANGE UP (ref 3.8–10.5)
WBC # FLD AUTO: 5.83 K/UL — SIGNIFICANT CHANGE UP (ref 3.8–10.5)

## 2022-06-30 PROCEDURE — 76705 ECHO EXAM OF ABDOMEN: CPT | Mod: 26

## 2022-06-30 RX ORDER — CLOTRIMAZOLE AND BETAMETHASONE DIPROPIONATE 10; .5 MG/G; MG/G
1 CREAM TOPICAL
Refills: 0 | Status: DISCONTINUED | OUTPATIENT
Start: 2022-06-30 | End: 2022-07-01

## 2022-06-30 RX ORDER — APIXABAN 2.5 MG/1
10 TABLET, FILM COATED ORAL EVERY 12 HOURS
Refills: 0 | Status: DISCONTINUED | OUTPATIENT
Start: 2022-06-30 | End: 2022-07-01

## 2022-06-30 RX ORDER — APIXABAN 2.5 MG/1
2 TABLET, FILM COATED ORAL
Qty: 120 | Refills: 0
Start: 2022-06-30 | End: 2022-07-29

## 2022-06-30 RX ORDER — HEPARIN SODIUM 5000 [USP'U]/ML
700 INJECTION INTRAVENOUS; SUBCUTANEOUS
Qty: 25000 | Refills: 0 | Status: DISCONTINUED | OUTPATIENT
Start: 2022-06-30 | End: 2022-06-30

## 2022-06-30 RX ADMIN — PANTOPRAZOLE SODIUM 40 MILLIGRAM(S): 20 TABLET, DELAYED RELEASE ORAL at 05:06

## 2022-06-30 RX ADMIN — Medication 650 MILLIGRAM(S): at 11:24

## 2022-06-30 RX ADMIN — Medication 650 MILLIGRAM(S): at 18:48

## 2022-06-30 RX ADMIN — OXYCODONE HYDROCHLORIDE 5 MILLIGRAM(S): 5 TABLET ORAL at 12:20

## 2022-06-30 RX ADMIN — HEPARIN SODIUM 700 UNIT(S)/HR: 5000 INJECTION INTRAVENOUS; SUBCUTANEOUS at 07:38

## 2022-06-30 RX ADMIN — Medication 1: at 08:53

## 2022-06-30 RX ADMIN — Medication 650 MILLIGRAM(S): at 12:20

## 2022-06-30 RX ADMIN — Medication 1 MILLIGRAM(S): at 11:25

## 2022-06-30 RX ADMIN — Medication 20 MILLIGRAM(S): at 05:06

## 2022-06-30 RX ADMIN — POLYETHYLENE GLYCOL 3350 17 GRAM(S): 17 POWDER, FOR SOLUTION ORAL at 11:25

## 2022-06-30 RX ADMIN — OXYCODONE HYDROCHLORIDE 5 MILLIGRAM(S): 5 TABLET ORAL at 17:57

## 2022-06-30 RX ADMIN — Medication 2: at 12:48

## 2022-06-30 RX ADMIN — Medication 650 MILLIGRAM(S): at 05:05

## 2022-06-30 RX ADMIN — Medication 145 MILLIGRAM(S): at 11:25

## 2022-06-30 RX ADMIN — SENNA PLUS 2 TABLET(S): 8.6 TABLET ORAL at 21:41

## 2022-06-30 RX ADMIN — Medication 650 MILLIGRAM(S): at 01:53

## 2022-06-30 RX ADMIN — CLOTRIMAZOLE AND BETAMETHASONE DIPROPIONATE 1 APPLICATION(S): 10; .5 CREAM TOPICAL at 17:57

## 2022-06-30 RX ADMIN — HEPARIN SODIUM 700 UNIT(S)/HR: 5000 INJECTION INTRAVENOUS; SUBCUTANEOUS at 16:07

## 2022-06-30 RX ADMIN — OXYCODONE HYDROCHLORIDE 5 MILLIGRAM(S): 5 TABLET ORAL at 11:24

## 2022-06-30 RX ADMIN — HEPARIN SODIUM 900 UNIT(S)/HR: 5000 INJECTION INTRAVENOUS; SUBCUTANEOUS at 04:08

## 2022-06-30 RX ADMIN — LOSARTAN POTASSIUM 25 MILLIGRAM(S): 100 TABLET, FILM COATED ORAL at 05:05

## 2022-06-30 RX ADMIN — Medication 5 MILLIGRAM(S): at 21:42

## 2022-06-30 RX ADMIN — APIXABAN 10 MILLIGRAM(S): 2.5 TABLET, FILM COATED ORAL at 17:57

## 2022-06-30 RX ADMIN — OXYCODONE HYDROCHLORIDE 5 MILLIGRAM(S): 5 TABLET ORAL at 18:48

## 2022-06-30 RX ADMIN — ATORVASTATIN CALCIUM 10 MILLIGRAM(S): 80 TABLET, FILM COATED ORAL at 21:42

## 2022-06-30 RX ADMIN — Medication 20 MILLIGRAM(S): at 13:53

## 2022-06-30 RX ADMIN — Medication 650 MILLIGRAM(S): at 02:10

## 2022-06-30 RX ADMIN — Medication 650 MILLIGRAM(S): at 17:57

## 2022-06-30 NOTE — PHYSICAL THERAPY INITIAL EVALUATION ADULT - GENERAL OBSERVATIONS, REHAB EVAL
Pt received semi-supine in bed in NAD, +IVF-Heparin, +premedicated for pain by NIKKY Jay with edema, VSS, agreeable to participate in therapy at this time

## 2022-06-30 NOTE — PROGRESS NOTE ADULT - NS ATTEND AMEND GEN_ALL_CORE FT
61 y/o male with metastatic lung cancer, on cabozantinib, admitted with skin changes and swelling of lower extremities. On heparin drip for VTE. Switch to direct oral anticoagulant if no further procedures are planned. [place] [facility]
agree with above

## 2022-06-30 NOTE — PHYSICAL THERAPY INITIAL EVALUATION ADULT - ASR EQUIP NEEDS DISCH PT EVAL
Wheelchair for community, RW for household ambulation, ambulance required for second floor setup as patient cannot negotiate steps at this time

## 2022-06-30 NOTE — PHYSICAL THERAPY INITIAL EVALUATION ADULT - ADDITIONAL COMMENTS
CT Abdomen & Pelvis 6/28/22: Metastatic lung cancer including right upper lobe nodule, retroperitoneal lymphadenopathy, and soft tissue mass abutting the right common iliac. Question of bladder implants. Mild proximal left hydroureteronephrosis with periureteral fat stranding. Moderate volume ascites with possible peritoneal carcinomatosis  VA Duplex R LE Vein Scan 6/28/22: Extensive right lower extremity DVT, as described above. Acute deep venous thrombosis: above and below the knee.  X-Ray R Ankle, Foot, Tib/Fib 6/28/22: No acute fracture or dislocation.

## 2022-06-30 NOTE — PHYSICAL THERAPY INITIAL EVALUATION ADULT - FOLLOWS COMMANDS/ANSWERS QUESTIONS, REHAB EVAL
Pt able to speak/understand basic English declining need for  service/100% of the time/able to follow single-step instructions

## 2022-06-30 NOTE — PHYSICAL THERAPY INITIAL EVALUATION ADULT - DIAGNOSIS, PT EVAL
Brandy Nieto(Resident) Pt presents with R LE pain/edema, complaints of LBP, pelvic pain, decreased strength, balance, endurance all impacting ability to perform ADLs and functional mobility

## 2022-06-30 NOTE — PHYSICAL THERAPY INITIAL EVALUATION ADULT - PLANNED THERAPY INTERVENTIONS, PT EVAL
STAIR GOAL: Pt will negotiate 1 FOS independently with HR assist within 3-4 wks./balance training/bed mobility training/gait training/transfer training

## 2022-06-30 NOTE — PROGRESS NOTE ADULT - SUBJECTIVE AND OBJECTIVE BOX
Patient is a 62y old  Male who presents with a chief complaint of RLE pain and edema (28 Jun 2022 23:50)    Patient seen this morning. Right leg still swollen but less painful. Currently on heparin gtt    MEDICATIONS  (STANDING):  acetaminophen     Tablet .. 650 milliGRAM(s) Oral every 6 hours  atorvastatin 10 milliGRAM(s) Oral at bedtime  dextrose 5%. 1000 milliLiter(s) (100 mL/Hr) IV Continuous <Continuous>  dextrose 5%. 1000 milliLiter(s) (50 mL/Hr) IV Continuous <Continuous>  dextrose 50% Injectable 25 Gram(s) IV Push once  dextrose 50% Injectable 12.5 Gram(s) IV Push once  dextrose 50% Injectable 25 Gram(s) IV Push once  fenofibrate Tablet 145 milliGRAM(s) Oral daily  folic acid 1 milliGRAM(s) Oral daily  glucagon  Injectable 1 milliGRAM(s) IntraMuscular once  heparin  Infusion. 1100 Unit(s)/Hr (11 mL/Hr) IV Continuous <Continuous>  insulin lispro (ADMELOG) corrective regimen sliding scale   SubCutaneous three times a day before meals  losartan 25 milliGRAM(s) Oral daily  melatonin 5 milliGRAM(s) Oral at bedtime  pantoprazole    Tablet 40 milliGRAM(s) Oral before breakfast  polyethylene glycol 3350 17 Gram(s) Oral daily  senna 2 Tablet(s) Oral at bedtime  torsemide 20 milliGRAM(s) Oral two times a day    MEDICATIONS  (PRN):  dextrose Oral Gel 15 Gram(s) Oral once PRN Blood Glucose LESS THAN 70 milliGRAM(s)/deciliter  heparin   Injectable 6000 Unit(s) IV Push every 6 hours PRN For aPTT less than 40  heparin   Injectable 3000 Unit(s) IV Push every 6 hours PRN For aPTT between 40 - 57  melatonin 3 milliGRAM(s) Oral at bedtime PRN Insomnia  morphine  - Injectable 4 milliGRAM(s) IV Push every 6 hours PRN Severe Pain (7 - 10)  ondansetron Injectable 4 milliGRAM(s) IV Push every 8 hours PRN Nausea and/or Vomiting  oxyCODONE    IR 5 milliGRAM(s) Oral every 6 hours PRN Moderate Pain (4 - 6)    Vital Signs Last 24 Hrs  T(C): 36.7 (29 Jun 2022 05:01), Max: 36.8 (29 Jun 2022 00:11)  T(F): 98 (29 Jun 2022 05:01), Max: 98.3 (29 Jun 2022 00:11)  HR: 66 (29 Jun 2022 05:01) (65 - 75)  BP: 108/74 (29 Jun 2022 05:01) (102/70 - 135/85)  BP(mean): --  RR: 17 (29 Jun 2022 05:01) (16 - 18)  SpO2: 98% (29 Jun 2022 05:01) (96% - 100%)    PE  NAD  Awake, alert  Anicteric, MMM  RRR  CTAB  Abd soft, NT, ND  Bilateral LE edema R>>L  Skin changes and desquamation both soles of feet and right palm                            15.1   6.29  )-----------( 116      ( 29 Jun 2022 02:08 )             47.9       06-28    139  |  105  |  22  ----------------------------<  187<H>  4.5   |  21<L>  |  0.94    Ca    9.2      28 Jun 2022 14:49    TPro  6.2  /  Alb  3.1<L>  /  TBili  0.8  /  DBili  x   /  AST  32  /  ALT  18  /  AlkPhos  70  06-28      ACC: [id number] EXAM:  DUPLEX EXT VEINS LOWER RT                          PROCEDURE DATE:  06/28/2022          INTERPRETATION:  CLINICAL INFORMATION: Right lower extremity pain and   swelling    COMPARISON: None available.    TECHNIQUE: Duplex sonography of the RIGHT LOWER extremity veins with   color and spectral Doppler, with and without compression.    FINDINGS:  There is extensive DVT throughout the right lower extremity extending   from the right external iliac vein into the common femoral, deep femoral,   femoral, popliteal and calf veins.    Left common femoral vein is patent.    IMPRESSION:  Extensive right lower extremity DVT, as described above.  Acute deep venous thrombosis: above and below the knee.        --- End of Report ---      ACC: [id number] EXAM:  DUPLEX LOW ARTERIES UNI LTD RT                          PROCEDURE DATE:  06/28/2022          INTERPRETATION:  HISTORY: Right lower extremity swelling and pain.    Right lower extremity arterial Doppler was performed using grayscale,   color and spectral Doppler. There are no prior studies available for   comparison.    Findings:    RIGHT: There is biphasic flow throughout the right lower extremity.   Scattered atherosclerotic plaque.  Peak systolic velocities are as follows (in CM/sec):  EIA: 87  CFA: 72  DFA: 73  SFA: (Proximal, mid, distal): 74, 89, 73  Popliteal: 54  PTA: 61, 61, 64  Peroneal: 47, 45, 23  SHO: 78, 59, 35  DPA: 23    IMPRESSION: No hemodynamically significant arterial stenosis or occlusion   in the right lower extremity.    --- End of Report ---      ACC: [id number] EXAM:  XR ANKLE COMP MIN 3 VIEWS RT                          PROCEDURE DATE:  06/28/2022          INTERPRETATION:  CLINICAL INFORMATION: Right ankle pain and swelling.    TECHNIQUE: 3 radiographic views of the right ankle.    COMPARISON: None.    FINDINGS:  No acute fracture or dislocation.  The joint spaces are maintained.  Enthesophyte formation is seen at the Achilles insertion. There is   moderate circumferential soft tissue swelling    IMPRESSION:  No acute fracture or dislocation.    --- End of Report ---      ACC: [id number] EXAM:  CT ABDOMEN AND PELVIS IC                          PROCEDURE DATE:  06/28/2022          INTERPRETATION:  CLINICAL INFORMATION: Lower abdominal pain, right lower   extremity swelling and pain. Metastatic lung cancer.    COMPARISON: CT scan 08/15/2013    CONTRAST/COMPLICATIONS:  IV Contrast: Omnipaque 350  60 cc administered   0 cc discarded  Oral Contrast: NONE  Complications: None reported at time of study completion    PROCEDURE:  CT of the Abdomen and Pelvis was performed.  Sagittal and coronal reformats were performed.    FINDINGS:  LOWER CHEST: Right upper lobe 6 x 8 mm nodule. Right middle lobe cyst.    LIVER: Steatosis.  BILE DUCTS: Normal caliber.  GALLBLADDER: Within normal limits.  SPLEEN: Within normal limits.  PANCREAS: Within normal limits.  ADRENALS: Within normal limits.  KIDNEYS/URETERS: Left ureteral stent with proximal pigtail within the   renal pelvis and distal pigtail within the urinary bladder. There is mild   proximal hydroureteronephrosis with periureteral fat stranding.   Periureteral left soft tissue thickening    BLADDER: Hyperdense focus within the right posterior bladder wall   measuring 1.4 x 1.5 x 2.0 cm. Additional subcentimeter hypodense foci   along the bladder roof.  REPRODUCTIVE ORGANS: Prostate within normal limits.    BOWEL: No bowel obstruction. Colonic diverticulosis without   diverticulitis. Appendix is normal.  PERITONEUM: Moderate volume ascites including perihepatic and perisplenic   ascites. Omental nodularity suggesting possible peritoneal carcinomatosis  VESSELS: Atherosclerotic changes.  RETROPERITONEUM/LYMPH NODES: Heterogeneous soft tissue mass abutting the   right common iliac vessels measuring 3.0 x 2.8 x 4.0 cm.  Retroperitoneal lymphadenopathy with few examples as below:  Left para-aortic measuring 1.4 cm (3:61)  Right para-aortic measuring 1.6 cm (3:70)  ABDOMINAL WALL: Small fat-containing umbilical hernia. Anasarca.  BONES: Degenerative changes. Sclerotic focus within the right ilium.    IMPRESSION:  Metastatic lung cancer including right upper lobe nodule, retroperitoneal   lymphadenopathy, and soft tissue mass abutting the right common iliac.   Question of bladder implants.  Mild proximal left hydroureteronephrosis with periureteral fat stranding.  Moderate volume ascites with possible peritoneal carcinomatosis    --- End of Report ---        
Due to condition, Lung Adenocarcinoma patient has a severe mobility limitation and requires a standard wheelchair to assist in daily ADLS. Patient cannot ambulate safely with a cane or a walker. Patient has sufficient upper body strength to self propel said wheelchair and has not expressed an unwillingness to use the wheelchair. patient has ample room in the home and a caregiver to assist with the wheelchair, Use of a manual wheelchair will significantly improve the patients ability to participate in daily ADL's and the patient will use it on a regular basis in the home  
Patient is a 62y old  Male who presents with a chief complaint of RLE pain and edema (30 Jun 2022 16:26)    Date of servie : 06-30-22 @ 19:06  INTERVAL HPI/OVERNIGHT EVENTS:  T(C): 36.8 (06-30-22 @ 14:03), Max: 36.8 (06-30-22 @ 04:35)  HR: 68 (06-30-22 @ 14:03) (62 - 70)  BP: 101/61 (06-30-22 @ 14:03) (96/63 - 124/83)  RR: 18 (06-30-22 @ 14:03) (17 - 18)  SpO2: 98% (06-30-22 @ 14:03) (97% - 99%)  Wt(kg): --  I&O's Summary    29 Jun 2022 07:01  -  30 Jun 2022 07:00  --------------------------------------------------------  IN: 1070 mL / OUT: 400 mL / NET: 670 mL    30 Jun 2022 07:01  -  30 Jun 2022 19:06  --------------------------------------------------------  IN: 480 mL / OUT: 0 mL / NET: 480 mL        LABS:                        14.8   5.83  )-----------( 140      ( 30 Jun 2022 14:25 )             46.3     06-29    138  |  103  |  24<H>  ----------------------------<  272<H>  3.8   |  23  |  1.11    Ca    8.8      29 Jun 2022 11:26  Mg     1.9     06-29    TPro  5.7<L>  /  Alb  2.9<L>  /  TBili  0.5  /  DBili  x   /  AST  25  /  ALT  15  /  AlkPhos  60  06-29    PTT - ( 30 Jun 2022 14:25 )  PTT:67.9 sec    CAPILLARY BLOOD GLUCOSE      POCT Blood Glucose.: 138 mg/dL (30 Jun 2022 17:37)  POCT Blood Glucose.: 202 mg/dL (30 Jun 2022 12:10)  POCT Blood Glucose.: 157 mg/dL (30 Jun 2022 08:37)  POCT Blood Glucose.: 176 mg/dL (29 Jun 2022 22:01)            MEDICATIONS  (STANDING):  acetaminophen     Tablet .. 650 milliGRAM(s) Oral every 6 hours  apixaban 10 milliGRAM(s) Oral every 12 hours  atorvastatin 10 milliGRAM(s) Oral at bedtime  clotrimazole/betamethasone Cream 1 Application(s) Topical two times a day  dextrose 5%. 1000 milliLiter(s) (100 mL/Hr) IV Continuous <Continuous>  dextrose 5%. 1000 milliLiter(s) (50 mL/Hr) IV Continuous <Continuous>  dextrose 50% Injectable 25 Gram(s) IV Push once  dextrose 50% Injectable 12.5 Gram(s) IV Push once  dextrose 50% Injectable 25 Gram(s) IV Push once  fenofibrate Tablet 145 milliGRAM(s) Oral daily  folic acid 1 milliGRAM(s) Oral daily  glucagon  Injectable 1 milliGRAM(s) IntraMuscular once  insulin lispro (ADMELOG) corrective regimen sliding scale   SubCutaneous three times a day before meals  losartan 25 milliGRAM(s) Oral daily  melatonin 5 milliGRAM(s) Oral at bedtime  pantoprazole    Tablet 40 milliGRAM(s) Oral before breakfast  polyethylene glycol 3350 17 Gram(s) Oral daily  senna 2 Tablet(s) Oral at bedtime  torsemide 20 milliGRAM(s) Oral two times a day    MEDICATIONS  (PRN):  dextrose Oral Gel 15 Gram(s) Oral once PRN Blood Glucose LESS THAN 70 milliGRAM(s)/deciliter  melatonin 3 milliGRAM(s) Oral at bedtime PRN Insomnia  morphine  - Injectable 4 milliGRAM(s) IV Push every 6 hours PRN Severe Pain (7 - 10)  ondansetron Injectable 4 milliGRAM(s) IV Push every 8 hours PRN Nausea and/or Vomiting  oxyCODONE    IR 5 milliGRAM(s) Oral every 6 hours PRN Moderate Pain (4 - 6)          PHYSICAL EXAM:  GENERAL: NAD, well-groomed, well-developed  HEAD:  Atraumatic, Normocephalic  CHEST/LUNG: Clear to percussion bilaterally; No rales, rhonchi, wheezing, or rubs  HEART: Regular rate and rhythm; No murmurs, rubs, or gallops  ABDOMEN: Soft, Nontender, Nondistended; Bowel sounds present  EXTREMITIES: edema +    Care Discussed with Consultants/Other Providers [ ] YES  [ ] NO
Patient is a 62y old  Male who presents with a chief complaint of RLE pain and edema (29 Jun 2022 10:58)    Date of servie : 06-29-22 @ 14:31  INTERVAL HPI/OVERNIGHT EVENTS:  T(C): 36.4 (06-29-22 @ 14:21), Max: 36.8 (06-29-22 @ 00:11)  HR: 66 (06-29-22 @ 14:21) (66 - 75)  BP: 112/74 (06-29-22 @ 14:21) (102/70 - 135/85)  RR: 18 (06-29-22 @ 14:21) (16 - 18)  SpO2: 99% (06-29-22 @ 14:21) (98% - 100%)  Wt(kg): --  I&O's Summary    28 Jun 2022 07:01  -  29 Jun 2022 07:00  --------------------------------------------------------  IN: 74 mL / OUT: 0 mL / NET: 74 mL    29 Jun 2022 07:01  -  29 Jun 2022 14:31  --------------------------------------------------------  IN: 720 mL / OUT: 0 mL / NET: 720 mL        LABS:                        14.7   5.62  )-----------( 118      ( 29 Jun 2022 11:25 )             45.5     06-29    138  |  103  |  24<H>  ----------------------------<  272<H>  3.8   |  23  |  1.11    Ca    8.8      29 Jun 2022 11:26  Mg     1.9     06-29    TPro  5.7<L>  /  Alb  2.9<L>  /  TBili  0.5  /  DBili  x   /  AST  25  /  ALT  15  /  AlkPhos  60  06-29    PT/INR - ( 28 Jun 2022 14:49 )   PT: 12.6 sec;   INR: 1.09 ratio         PTT - ( 29 Jun 2022 11:26 )  PTT:157.6 sec    CAPILLARY BLOOD GLUCOSE      POCT Blood Glucose.: 249 mg/dL (29 Jun 2022 12:21)  POCT Blood Glucose.: 190 mg/dL (29 Jun 2022 08:43)  POCT Blood Glucose.: 157 mg/dL (29 Jun 2022 00:43)            MEDICATIONS  (STANDING):  acetaminophen     Tablet .. 650 milliGRAM(s) Oral every 6 hours  atorvastatin 10 milliGRAM(s) Oral at bedtime  dextrose 5%. 1000 milliLiter(s) (100 mL/Hr) IV Continuous <Continuous>  dextrose 5%. 1000 milliLiter(s) (50 mL/Hr) IV Continuous <Continuous>  dextrose 50% Injectable 25 Gram(s) IV Push once  dextrose 50% Injectable 12.5 Gram(s) IV Push once  dextrose 50% Injectable 25 Gram(s) IV Push once  fenofibrate Tablet 145 milliGRAM(s) Oral daily  folic acid 1 milliGRAM(s) Oral daily  glucagon  Injectable 1 milliGRAM(s) IntraMuscular once  heparin  Infusion. 1100 Unit(s)/Hr (11 mL/Hr) IV Continuous <Continuous>  insulin lispro (ADMELOG) corrective regimen sliding scale   SubCutaneous three times a day before meals  losartan 25 milliGRAM(s) Oral daily  melatonin 5 milliGRAM(s) Oral at bedtime  pantoprazole    Tablet 40 milliGRAM(s) Oral before breakfast  polyethylene glycol 3350 17 Gram(s) Oral daily  senna 2 Tablet(s) Oral at bedtime  torsemide 20 milliGRAM(s) Oral two times a day    MEDICATIONS  (PRN):  dextrose Oral Gel 15 Gram(s) Oral once PRN Blood Glucose LESS THAN 70 milliGRAM(s)/deciliter  heparin   Injectable 6000 Unit(s) IV Push every 6 hours PRN For aPTT less than 40  heparin   Injectable 3000 Unit(s) IV Push every 6 hours PRN For aPTT between 40 - 57  melatonin 3 milliGRAM(s) Oral at bedtime PRN Insomnia  morphine  - Injectable 4 milliGRAM(s) IV Push every 6 hours PRN Severe Pain (7 - 10)  ondansetron Injectable 4 milliGRAM(s) IV Push every 8 hours PRN Nausea and/or Vomiting  oxyCODONE    IR 5 milliGRAM(s) Oral every 6 hours PRN Moderate Pain (4 - 6)          PHYSICAL EXAM:  GENERAL: NAD, well-groomed, well-developed  HEAD:  Atraumatic, Normocephalic  CHEST/LUNG: Clear to percussion bilaterally; No rales, rhonchi, wheezing, or rubs  HEART: Regular rate and rhythm; No murmurs, rubs, or gallops  ABDOMEN: Soft, Nontender, Nondistended; Bowel sounds present  EXTREMITIES:  2+ Peripheral Pulses, No clubbing, cyanosis, or edema  LYMPH: No lymphadenopathy noted  SKIN: No rashes or lesions    Care Discussed with Consultants/Other Providers [ ] YES  [ ] NO
Patient is a 62y old  Male who presents with a chief complaint of RLE pain and edema (29 Jun 2022 16:32)    Patient seen this morning. Still endorsing RLE pain and swelling.    MEDICATIONS  (STANDING):  acetaminophen     Tablet .. 650 milliGRAM(s) Oral every 6 hours  apixaban 10 milliGRAM(s) Oral every 12 hours  atorvastatin 10 milliGRAM(s) Oral at bedtime  clotrimazole/betamethasone Cream 1 Application(s) Topical two times a day  dextrose 5%. 1000 milliLiter(s) (100 mL/Hr) IV Continuous <Continuous>  dextrose 5%. 1000 milliLiter(s) (50 mL/Hr) IV Continuous <Continuous>  dextrose 50% Injectable 25 Gram(s) IV Push once  dextrose 50% Injectable 12.5 Gram(s) IV Push once  dextrose 50% Injectable 25 Gram(s) IV Push once  fenofibrate Tablet 145 milliGRAM(s) Oral daily  folic acid 1 milliGRAM(s) Oral daily  glucagon  Injectable 1 milliGRAM(s) IntraMuscular once  heparin  Infusion. 700 Unit(s)/Hr (7 mL/Hr) IV Continuous <Continuous>  insulin lispro (ADMELOG) corrective regimen sliding scale   SubCutaneous three times a day before meals  losartan 25 milliGRAM(s) Oral daily  melatonin 5 milliGRAM(s) Oral at bedtime  pantoprazole    Tablet 40 milliGRAM(s) Oral before breakfast  polyethylene glycol 3350 17 Gram(s) Oral daily  senna 2 Tablet(s) Oral at bedtime  torsemide 20 milliGRAM(s) Oral two times a day    MEDICATIONS  (PRN):  dextrose Oral Gel 15 Gram(s) Oral once PRN Blood Glucose LESS THAN 70 milliGRAM(s)/deciliter  heparin   Injectable 6000 Unit(s) IV Push every 6 hours PRN For aPTT less than 40  heparin   Injectable 3000 Unit(s) IV Push every 6 hours PRN For aPTT between 40 - 57  melatonin 3 milliGRAM(s) Oral at bedtime PRN Insomnia  morphine  - Injectable 4 milliGRAM(s) IV Push every 6 hours PRN Severe Pain (7 - 10)  ondansetron Injectable 4 milliGRAM(s) IV Push every 8 hours PRN Nausea and/or Vomiting  oxyCODONE    IR 5 milliGRAM(s) Oral every 6 hours PRN Moderate Pain (4 - 6)      Vital Signs Last 24 Hrs  T(C): 36.8 (30 Jun 2022 04:35), Max: 36.8 (30 Jun 2022 04:35)  T(F): 98.3 (30 Jun 2022 04:35), Max: 98.3 (30 Jun 2022 04:35)  HR: 62 (30 Jun 2022 04:35) (62 - 70)  BP: 124/83 (30 Jun 2022 04:35) (110/76 - 124/83)  BP(mean): --  RR: 18 (30 Jun 2022 04:35) (17 - 18)  SpO2: 99% (30 Jun 2022 04:35) (97% - 99%)    PE  NAD  Awake, alert  Anicteric, MMM  RRR  CTAB  Abd soft, NT, ND  No change in RLE edema  Skin changes stable                            15.1   5.16  )-----------( 117      ( 30 Jun 2022 08:40 )             46.9       06-29    138  |  103  |  24<H>  ----------------------------<  272<H>  3.8   |  23  |  1.11    Ca    8.8      29 Jun 2022 11:26  Mg     1.9     06-29    TPro  5.7<L>  /  Alb  2.9<L>  /  TBili  0.5  /  DBili  x   /  AST  25  /  ALT  15  /  AlkPhos  60  06-29

## 2022-06-30 NOTE — PHYSICAL THERAPY INITIAL EVALUATION ADULT - DISCHARGE DISPOSITION, PT EVAL
Home with hands on assistance for ALL aspects of functional mobility and self care from spouse and son/home w/ home PT

## 2022-06-30 NOTE — PHYSICAL THERAPY INITIAL EVALUATION ADULT - PERTINENT HX OF CURRENT PROBLEM, REHAB EVAL
61yo M w/ pmhx of MET amplified/mutation adenocarcinoma of the lung, mediastinal and abdominopelvic LNs, pericardium, pleura, s/p multiple lines of therapy, currently on cabozantinib 40mg PO daily, DM2, HLD, HTN, hx of DVT p/w severe right foot pain and swelling which started about 3 weeks ago now with inability to walk on it. Patient [place] 1 [place] [facility] [place]. CONTINUED:

## 2022-06-30 NOTE — DISCHARGE NOTE PROVIDER - NSDCCPCAREPLAN_GEN_ALL_CORE_FT
PRINCIPAL DISCHARGE DIAGNOSIS  Diagnosis: DVT, lower extremity  Assessment and Plan of Treatment: Please follow up with your Primary Care Provider, Dr. Otis Proctor, regarding your hospitalization. Please schedule an appointment with your primary care provider within two weeks after your discharge to review your hospital course.  Call their office [phone] for appointment.        SECONDARY DISCHARGE DIAGNOSES  Diagnosis: Stage 4 lung cancer  Assessment and Plan of Treatment: Please follow up with your Hematologist/Oncologist, Dr. Sha Anthony, regarding your hospitalization. Please schedule an appointment within two weeks after your discharge to review your hospital course.  Call their office [phone] for appointment.    Diagnosis: Desquamated skin  Assessment and Plan of Treatment: Podiatry, Dr. Simmons in 2 weeks. Call for appointment:[phone]

## 2022-06-30 NOTE — DISCHARGE NOTE PROVIDER - CARE PROVIDER_API CALL
Theresa Simmons (DPM)  Surgery  [address]  [place] [address]  Phone: [phone]  Fax: [phone]  Established Patient  Follow Up Time: 2 weeks    Sha Anthony  [address] [place] [address]  Phone: [phone]  Fax: (   )    -  Established Patient  Follow Up Time: 2 weeks    Otis Proctor  Phone: [phone]  Fax: (   )    -  Established Patient  Follow Up Time: 2 weeks

## 2022-06-30 NOTE — CHART NOTE - NSCHARTNOTEFT_GEN_A_CORE
Wound Care Team Note:    Request for wound care consult for foot wounds received. Podiatrist, Dr. Simmons has seen patient already. Will defer to podiatry for management. Please refer any questions/concerns to Podiatry. Will not follow.    Zainab Mortensen, DAYSI-C, OCN [id number]

## 2022-06-30 NOTE — PHYSICAL THERAPY INITIAL EVALUATION ADULT - PRECAUTIONS/LIMITATIONS, REHAB EVAL
Patient states that his toes were a black color yesterday and prompted visit to seek medical care but have improved in color since. He has numerous ulcers on his right foot. Patient denies fevers, chills, lightheadedness, dizziness, SOB, chest pain, N/V/D/C. Patient also has swelling and skin changes to left foot as well as right hand, but not as severe as on R foot. Pt admitted to [facility] for severe RLE edema, skin desquamation and skin color change found to have large extensive RLE DVT./fall precautions

## 2022-06-30 NOTE — DISCHARGE NOTE PROVIDER - NSDCMRMEDTOKEN_GEN_ALL_CORE_FT
Cabometyx 20 mg oral tablet: 1 tab(s) orally once a day.    NOTE: As per patient he stopped taking this medication on JUNE 23rd  Eliquis Starter Pack for Treatment of DVT and PE 5 mg oral tablet: 2 tab(s) orally 2 times a day   10 mg BID x 7 days then  5 mg BID   ezetimibe 10 mg oral tablet: 1 tab(s) orally once a day  fenofibrate 160 mg oral tablet: 1 tab(s) orally once a day  folic acid 1 mg oral tablet: 1 tab(s) orally once a day  losartan 25 mg oral tablet: 1 tab(s) orally once a day  metFORMIN 500 mg oral tablet, extended release: 4 tab(s) orally once a day  nadolol 40 mg oral tablet: 1 tab(s) orally once a day  nitroglycerin 0.4 mg sublingual tablet: 1 tab(s) sublingual , As Needed  omeprazole 40 mg oral delayed release capsule: 1 cap(s) orally once a day  pravastatin 20 mg oral tablet: 1 tab(s) orally once a day  Rolling Waker :   torsemide 20 mg oral tablet: 1 tab(s) orally 2 times a day  wheelchair:    Cabometyx 20 mg oral tablet: 1 tab(s) orally once a day.    NOTE: As per patient he stopped taking this medication on JUNE 23rd  clotrimazole-betamethasone dipropionate 1%-0.05% topical cream: 1 application topically 2 times a day  Eliquis Starter Pack for Treatment of DVT and PE 5 mg oral tablet: 2 tab(s) orally 2 times a day   10 mg BID x 7 days then  5 mg BID   ezetimibe 10 mg oral tablet: 1 tab(s) orally once a day  fenofibrate 160 mg oral tablet: 1 tab(s) orally once a day  folic acid 1 mg oral tablet: 1 tab(s) orally once a day  losartan 25 mg oral tablet: 1 tab(s) orally once a day  metFORMIN 500 mg oral tablet, extended release: 4 tab(s) orally once a day  nadolol 40 mg oral tablet: 1 tab(s) orally once a day  nitroglycerin 0.4 mg sublingual tablet: 1 tab(s) sublingual , As Needed  omeprazole 40 mg oral delayed release capsule: 1 cap(s) orally once a day  oxyCODONE 5 mg oral tablet: 1 tab(s) orally every 8 hours, As Needed -Moderate Pain (4 - 6) MDD:3  pravastatin 20 mg oral tablet: 1 tab(s) orally once a day  Rolling Waker :   torsemide 20 mg oral tablet: 1 tab(s) orally 2 times a day  wheelchair:    clotrimazole-betamethasone dipropionate 1%-0.05% topical cream: 1 application topically 2 times a day  Eliquis Starter Pack for Treatment of DVT and PE 5 mg oral tablet: 2 tab(s) orally 2 times a day   10 mg BID x 7 days then  5 mg BID   fenofibrate 160 mg oral tablet: 1 tab(s) orally once a day  folic acid 1 mg oral tablet: 1 tab(s) orally once a day  losartan 25 mg oral tablet: 1 tab(s) orally once a day  melatonin 5 mg oral tablet: 1 tab(s) orally once a day (at bedtime), As Needed  metFORMIN 500 mg oral tablet, extended release: 4 tab(s) orally once a day  omeprazole 40 mg oral delayed release capsule: 1 cap(s) orally once a day  oxyCODONE 5 mg oral tablet: 1 tab(s) orally every 8 hours, As Needed -Moderate Pain (4 - 6) MDD:3  pravastatin 20 mg oral tablet: 1 tab(s) orally once a day  Rolling Waker :   torsemide 20 mg oral tablet: 1 tab(s) orally 2 times a day  wheelchair:

## 2022-06-30 NOTE — DISCHARGE NOTE PROVIDER - HOSPITAL COURSE
62M w/ pmhx of MET amplified/mutation adenocarcinoma of the lung, mediastinal and abdominopelvic LNs, pericardium, pleura, s/p multiple lines of therapy, currently on cabozantinib 40mg PO daily, DM2, HLD, HTN, hx of DVT p/w severe right foot pain and swelling which started about 3 weeks ago now with inability to walk on it. Patient [place] 1 [place] [facility] [place]. Patient states that his toes were a black color yesterday and prompted visit to seek medical care but have improved in color today. He has numerous ulcers on his right foot. Patient denies fevers, chills, lightheadedness, dizziness, SOB, chest pain, N/V/D/C. Patient also has swelling and skin changes to left foot as well as right hand, but not as severe as on R foot.    Of note patient started cabozantinib about 1 week prior to foot and skin changes. In ER: Given Heparin gtt, morphine 4mg IV, zofran 4mg IV    Pt was admitted for symptomatic relief, pain control, IV fluids and further evaluation and treatment.    Hem/Onc consulted and noted [place] [facility] [place] Sha [place]. Pt currently on Cabozantanib - would hold during admission. Ongoing management after discharge. Painful RLE edema, patient with bilateral skin changes, desquamation on soles of feet but R>>L, also some skin breakdown palm of right hand -hand/foot syndrome is a known AE associated with cabozantinib. Patient seen by Podiatry, Rx lotrisone cream to apply twice daily to skin lesions, right and left foot, stable for DC once stable per medicine    Venous Doppler studies positive for extensive RLE DVT, arterial Dopplers were negative. Abdominal CT negative for IVC thrombus. Patient does have a PMH of RLE DVT as well as a PMH of a LN groin dissection, started on Eliquis 10 mg PO BID.    Pt is tolerating a diet, voiding and ambulating.  Pt is ready for discharge in stable condition.  Pt will follow up with Hematology/Oncology  Dr. Sha Anthony of [facility] [place] and Primary Care Physician, as an outpatient in one to two weeks.

## 2022-06-30 NOTE — DISCHARGE NOTE PROVIDER - NSDCFUADDAPPT_GEN_ALL_CORE_FT
Please follow up with:    1. Dr. Simmons, Podiatry, -fu within 2 weeks of DC  Call for appointment:[phone]  2. Dr. Sah Anthony, Hematology/Oncology. Call their office for appointment: [phone].  3. Primary [facility] Physician, Dr. Proctor Kin, [phone]

## 2022-06-30 NOTE — PHYSICAL THERAPY INITIAL EVALUATION ADULT - LIVES WITH, PROFILE
children/spouse Pt resides with spouse and son in private home with no steps to enter, +11-12 steps inside with HR assist. Pt reports his wife assist with ALL aspects of functional mobility and self care without DME/AD. (-) driving/children/spouse

## 2022-06-30 NOTE — PROGRESS NOTE ADULT - ASSESSMENT
62M w/ pmhx of MET amplified/mutation adenocarcinoma of the lung, mediastinal and abdominopelvic LNs, pericardium, pleura, s/p multiple lines of therapy, currently on cabozantinib 40mg PO daily, DM2, HLD, HTN, hx of DVT p/w severe RLE edema, skin desquamation and skin color change found to have large extensive RLE DVT    Problem/Plan - 1:  ·  Problem: Acute deep vein thrombosis (DVT) of lower extremity.   ·  Plan: Extensive RLE DVT noted on dopplers. Soft tissue mass abutting the right common iliac likely contributing. Metastatic malignancy as well.  -Cont. heparin gtt  -Trend PTT  -Pain control, will cont. IV morphine for now    Problem/Plan - 2:  ·  Problem: Desquamated skin.   ·  Plan: Thought to be possible side effect to cabozantinib  -Cont. to hold cabozantinib for now  -F/u hem/onc recommendations.    Problem/Plan - 3:  ·  Problem: Stage 4 lung cancer.   ·  Plan: MET amplified/mutation adenocarcinoma of the lung, mediastinal and abdominopelvic LNs, pericardium, pleura, s/p multiple lines of therapy, currently on cabozantinib 40mg PO daily. Appreciate hem/onc recommendations  -Hold Cabozantinib for now  -F/u hem/onc recommendations  -Suspect pt's abd pain related to peritoneal carcinomatosis and possible bladder mets.    Problem/Plan - 4:  ·  Problem: Diabetes mellitus.   ·  Plan: On PO meds at home  -Fingersticks and sliding scale      Problem/Plan - 5:  ·  Problem: Hyperlipidemia.   ·  Plan: -Cont. pravastatin substitution  -Cont. fenofibrate  -On Zetia at home.    Problem/Plan - 6:  ·  Problem: Essential hypertension.   ·  Plan: -Trend BP  -Cont. torsemide BID for now    
Patient under care at [facility] Dr. Sha Anthony for metastatic  MET amplified/mutation adenocarcinoma of the lung (originally diagnosed in 2013), mediastinal and abdominopelvic LNs, pericardium, pleura, s/p multiple lines of therapy, currently on cabozantinib 40mg PO daily referred to [facility] ED with severe right foot pain and swelling which started about 3 weeks ago now with inability to walk on it. Patient was given Dilaudid 1 mg IV at [facility] before being sent to ED. Patient states that his toes were a black color yesterday, but have improved in color today. He has numerous ulcers on his right foot. Patient denies fevers, chills, lightheadedness, dizziness, SOB, chest pain, N/V/D/C. Patient also has swelling and skin changes to left foot as well as right hand, but not as severe as on R foot.    Of note patient started cabozantinib about 1 week prior to foot and skin changes.    Metastatic adenocarcinoma of lung  --Under care by Dr. Sha Anthony of [facility]  --Currently on cabozantanib - would hold during admission  --Ongoing management after discharge    Painful RLE edema  --Patient with bilateral skin changes, desquamation on soles of feet but R>>L  --Also some skin breakdown palm of right hand  --Hand/foot syndrome is a known AE associated with cabozantinib  --Patient seen by Podiatry  --Lotrisone prescribed    RLE DVT  --Venous Doppler studies positive for extensive RLE DVT  --Arterial Dopplers were negative  --Abdominal CT negative for IVC thrombus  --Patient does have a PMH of RLE DVT as well as a PMH of a LN groin dissection for   --Started on Eliquis 10 mg PO BID    Discussed case with Dr. Jaswinder Damian.     We will continue to follow patient and coordinate with [facility].    Ismael Liz PA-C  Hematology/Oncology  [facility]   [phone] (office)  [phone] (alt office)  Evenings and weekends please call MD on call or office  
62M w/ pmhx of MET amplified/mutation adenocarcinoma of the lung, mediastinal and abdominopelvic LNs, pericardium, pleura, s/p multiple lines of therapy, currently on cabozantinib 40mg PO daily, DM2, HLD, HTN, hx of DVT p/w severe RLE edema, skin desquamation and skin color change found to have large extensive RLE DVT    Problem/Plan - 1:  ·  Problem: Acute deep vein thrombosis (DVT) of lower extremity.   ·  Plan: Extensive RLE DVT noted on dopplers. Soft tissue mass abutting the right common iliac likely contributing. Metastatic malignancy as well.  - change to DOAC today   -Trend PTT    Problem/Plan - 2:  ·  Problem: Desquamated skin.   ·  Plan: Thought to be possible side effect to cabozantinib  -Cont. to hold cabozantinib for now  -F/u hem/onc recommendations.    Problem/Plan - 3:  ·  Problem: Stage 4 lung cancer.   ·  Plan: MET amplified/mutation adenocarcinoma of the lung, mediastinal and abdominopelvic LNs, pericardium, pleura, s/p multiple lines of therapy, currently on cabozantinib 40mg PO daily. Appreciate hem/onc recommendations  -Hold Cabozantinib for now  -F/u hem/onc recommendations  -Suspect pt's abd pain related to peritoneal carcinomatosis and possible bladder mets.    Problem/Plan - 4:  ·  Problem: Diabetes mellitus.   ·  Plan: On PO meds at home  -Fingersticks and sliding scale      Problem/Plan - 5:  ·  Problem: Hyperlipidemia.   ·  Plan: -Cont. pravastatin substitution  -Cont. fenofibrate  -On Zetia at home.    Problem/Plan - 6:  ·  Problem: Essential hypertension.   ·  Plan: -Trend BP  -Cont. torsemide BID for now    
Patient under [place] at [facility] [place]. Sha Anthony for metastatic  MET amplified/mutation adenocarcinoma of the lung (originally diagnosed in 2013), mediastinal and abdominopelvic LNs, pericardium, pleura, s/p multiple lines of therapy, currently on cabozantinib 40mg PO daily referred to [facility] ED with severe right foot pain and swelling which started about 3 weeks ago now with inability to walk on it. Patient was given Dilaudid 1 mg IV [place] [facility] [place] to [place]. Patient states that his toes were a black color yesterday, but have improved in color today. He has numerous ulcers on his right foot. Patient denies fevers, chills, lightheadedness, dizziness, SOB, chest pain, N/V/D/C. Patient also has swelling and skin changes to left foot as well as right hand, but not as severe as on R foot.    Of note patient started cabozantinib about 1 week prior to foot and skin changes.    Metastatic adenocarcinoma of lung  --Under care by Dr. Sha Anthony of [facility] [place]  --Currently on cabozantanib - would hold during admission  --Ongoing management after discharge    Painful RLE edema  --Patient with bilateral skin changes, desquamation on soles of feet but R>>L  --Also some skin breakdown palm of right hand  --Hand/foot syndrome is a known AE associated with cabozantinib  --Would recommend urea-based emollients, steroid topical cream  --Wound care consult may also be of benefit    RLE DVT  --Venous Doppler studies positive for extensive RLE DVT  --Arterial Dopplers were negative  --Abdominal CT negative for IVC thrombus  --Patient does have a PMH of RLE DVT as well as a PMH of a LN groin dissection for   --Currently on heparin gtt  --May transition to DOAC prior to discharge    We will continue to follow patient and coordinate with [facility].    Ismael Liz PA-C  Hematology/Oncology  [facility]   [phone] (office)  [phone] (alt office)  Evenings and weekends please call MD on call or office

## 2022-06-30 NOTE — DISCHARGE NOTE PROVIDER - PROVIDER TOKENS
PROVIDER:[TOKEN:[[id number]],FOLLOWUP:[2 weeks],ESTABLISHEDPATIENT:[T]],FREE:[LAST:[Ng],FIRST:[Sha],PHONE:[phone]],FAX:[(   )    -],ADDRESS:[[address] [place] [address]],FOLLOWUP:[2 weeks],ESTABLISHEDPATIENT:[T]],FREE:[LAST:[Proctor],FIRST:[Kin],PHONE:[phone]],FAX:[(   )    -],FOLLOWUP:[2 weeks],ESTABLISHEDPATIENT:[T]]

## 2022-07-01 ENCOUNTER — TRANSCRIPTION ENCOUNTER (OUTPATIENT)
Age: 62
End: 2022-07-01

## 2022-07-01 VITALS
RESPIRATION RATE: 17 BRPM | OXYGEN SATURATION: 99 % | DIASTOLIC BLOOD PRESSURE: 64 MMHG | TEMPERATURE: 98 F | HEART RATE: 73 BPM | SYSTOLIC BLOOD PRESSURE: 93 MMHG

## 2022-07-01 LAB
GLUCOSE BLDC GLUCOMTR-MCNC: 155 MG/DL — HIGH (ref 70–99)
GLUCOSE BLDC GLUCOMTR-MCNC: 179 MG/DL — HIGH (ref 70–99)
HCT VFR BLD CALC: 44.7 % — SIGNIFICANT CHANGE UP (ref 39–50)
HGB BLD-MCNC: 14.4 G/DL — SIGNIFICANT CHANGE UP (ref 13–17)
MCHC RBC-ENTMCNC: 30.1 PG — SIGNIFICANT CHANGE UP (ref 27–34)
MCHC RBC-ENTMCNC: 32.2 GM/DL — SIGNIFICANT CHANGE UP (ref 32–36)
MCV RBC AUTO: 93.5 FL — SIGNIFICANT CHANGE UP (ref 80–100)
NRBC # BLD: 0 /100 WBCS — SIGNIFICANT CHANGE UP (ref 0–0)
PLATELET # BLD AUTO: 115 K/UL — LOW (ref 150–400)
RBC # BLD: 4.78 M/UL — SIGNIFICANT CHANGE UP (ref 4.2–5.8)
RBC # FLD: 15.7 % — HIGH (ref 10.3–14.5)
WBC # BLD: 5.41 K/UL — SIGNIFICANT CHANGE UP (ref 3.8–10.5)
WBC # FLD AUTO: 5.41 K/UL — SIGNIFICANT CHANGE UP (ref 3.8–10.5)

## 2022-07-01 PROCEDURE — 83735 ASSAY OF MAGNESIUM: CPT

## 2022-07-01 PROCEDURE — 97162 PT EVAL MOD COMPLEX 30 MIN: CPT

## 2022-07-01 PROCEDURE — 74177 CT ABD & PELVIS W/CONTRAST: CPT | Mod: MA

## 2022-07-01 PROCEDURE — 85027 COMPLETE CBC AUTOMATED: CPT

## 2022-07-01 PROCEDURE — 99285 EMERGENCY DEPT VISIT HI MDM: CPT

## 2022-07-01 PROCEDURE — 83036 HEMOGLOBIN GLYCOSYLATED A1C: CPT

## 2022-07-01 PROCEDURE — 85610 PROTHROMBIN TIME: CPT

## 2022-07-01 PROCEDURE — 0225U NFCT DS DNA&RNA 21 SARSCOV2: CPT

## 2022-07-01 PROCEDURE — 93926 LOWER EXTREMITY STUDY: CPT

## 2022-07-01 PROCEDURE — 96374 THER/PROPH/DIAG INJ IV PUSH: CPT

## 2022-07-01 PROCEDURE — 80053 COMPREHEN METABOLIC PANEL: CPT

## 2022-07-01 PROCEDURE — 73590 X-RAY EXAM OF LOWER LEG: CPT

## 2022-07-01 PROCEDURE — 76705 ECHO EXAM OF ABDOMEN: CPT

## 2022-07-01 PROCEDURE — 36415 COLL VENOUS BLD VENIPUNCTURE: CPT

## 2022-07-01 PROCEDURE — 85025 COMPLETE CBC W/AUTO DIFF WBC: CPT

## 2022-07-01 PROCEDURE — 73610 X-RAY EXAM OF ANKLE: CPT

## 2022-07-01 PROCEDURE — 82962 GLUCOSE BLOOD TEST: CPT

## 2022-07-01 PROCEDURE — 93971 EXTREMITY STUDY: CPT

## 2022-07-01 PROCEDURE — 93005 ELECTROCARDIOGRAM TRACING: CPT

## 2022-07-01 PROCEDURE — 85730 THROMBOPLASTIN TIME PARTIAL: CPT

## 2022-07-01 PROCEDURE — 73630 X-RAY EXAM OF FOOT: CPT

## 2022-07-01 RX ORDER — LANOLIN ALCOHOL/MO/W.PET/CERES
1 CREAM (GRAM) TOPICAL
Qty: 0 | Refills: 0 | DISCHARGE
Start: 2022-07-01

## 2022-07-01 RX ORDER — OXYCODONE HYDROCHLORIDE 5 MG/1
1 TABLET ORAL
Qty: 15 | Refills: 0
Start: 2022-07-01 | End: 2022-07-05

## 2022-07-01 RX ORDER — NADOLOL 80 MG/1
1 TABLET ORAL
Qty: 0 | Refills: 0 | DISCHARGE

## 2022-07-01 RX ORDER — NITROGLYCERIN 6.5 MG
1 CAPSULE, EXTENDED RELEASE ORAL
Qty: 0 | Refills: 0 | DISCHARGE

## 2022-07-01 RX ORDER — CABOZANTINIB 140 MG/DAY
1 KIT ORAL
Qty: 0 | Refills: 0 | DISCHARGE

## 2022-07-01 RX ORDER — EZETIMIBE 10 MG/1
1 TABLET ORAL
Qty: 0 | Refills: 0 | DISCHARGE

## 2022-07-01 RX ORDER — CLOTRIMAZOLE AND BETAMETHASONE DIPROPIONATE 10; .5 MG/G; MG/G
1 CREAM TOPICAL
Qty: 0 | Refills: 0 | DISCHARGE
Start: 2022-07-01

## 2022-07-01 RX ADMIN — Medication 650 MILLIGRAM(S): at 06:24

## 2022-07-01 RX ADMIN — APIXABAN 10 MILLIGRAM(S): 2.5 TABLET, FILM COATED ORAL at 05:35

## 2022-07-01 RX ADMIN — OXYCODONE HYDROCHLORIDE 5 MILLIGRAM(S): 5 TABLET ORAL at 06:24

## 2022-07-01 RX ADMIN — Medication 1 MILLIGRAM(S): at 11:37

## 2022-07-01 RX ADMIN — Medication 650 MILLIGRAM(S): at 12:30

## 2022-07-01 RX ADMIN — OXYCODONE HYDROCHLORIDE 5 MILLIGRAM(S): 5 TABLET ORAL at 11:36

## 2022-07-01 RX ADMIN — OXYCODONE HYDROCHLORIDE 5 MILLIGRAM(S): 5 TABLET ORAL at 05:34

## 2022-07-01 RX ADMIN — Medication 1: at 12:14

## 2022-07-01 RX ADMIN — PANTOPRAZOLE SODIUM 40 MILLIGRAM(S): 20 TABLET, DELAYED RELEASE ORAL at 05:35

## 2022-07-01 RX ADMIN — Medication 1: at 08:31

## 2022-07-01 RX ADMIN — OXYCODONE HYDROCHLORIDE 5 MILLIGRAM(S): 5 TABLET ORAL at 12:30

## 2022-07-01 RX ADMIN — CLOTRIMAZOLE AND BETAMETHASONE DIPROPIONATE 1 APPLICATION(S): 10; .5 CREAM TOPICAL at 05:36

## 2022-07-01 RX ADMIN — LOSARTAN POTASSIUM 25 MILLIGRAM(S): 100 TABLET, FILM COATED ORAL at 05:35

## 2022-07-01 RX ADMIN — Medication 20 MILLIGRAM(S): at 13:42

## 2022-07-01 RX ADMIN — Medication 650 MILLIGRAM(S): at 11:36

## 2022-07-01 RX ADMIN — OXYCODONE HYDROCHLORIDE 5 MILLIGRAM(S): 5 TABLET ORAL at 00:01

## 2022-07-01 RX ADMIN — OXYCODONE HYDROCHLORIDE 5 MILLIGRAM(S): 5 TABLET ORAL at 01:54

## 2022-07-01 RX ADMIN — Medication 145 MILLIGRAM(S): at 11:37

## 2022-07-01 RX ADMIN — Medication 20 MILLIGRAM(S): at 05:41

## 2022-07-01 RX ADMIN — Medication 650 MILLIGRAM(S): at 05:32

## 2022-07-01 NOTE — DISCHARGE NOTE NURSING/CASE MANAGEMENT/SOCIAL WORK - NSDCPEFALRISK_GEN_ALL_CORE
For information on Fall & Injury Prevention, visit: [email or web address] OR  [email or web address] OR  https://www.cdc.gov/steadi/patient.html

## 2022-07-01 NOTE — DISCHARGE NOTE NURSING/CASE MANAGEMENT/SOCIAL WORK - NSDCFUADDAPPT_GEN_ALL_CORE_FT
Please follow up with:    1. Dr. Simmons, Podiatry, -fu within 2 weeks of DC  Call for appointment:[phone]  2. Dr. Sha Anthony, Hematology/Oncology. Call their office for appointment: [phone].  3. Primary [facility] Physician, Dr. Proctor Kin, [phone]

## 2022-07-01 NOTE — DISCHARGE NOTE NURSING/CASE MANAGEMENT/SOCIAL WORK - PATIENT PORTAL LINK FT
You can access the FollowMyHealth Patient Portal offered by [facility] by registering at the following website: [email or web address]. By joining [employer]’s FollowMyHealth portal, you will also be able to view your health information using other applications (apps) compatible with our system.

## 2022-08-21 NOTE — PROVIDER CONTACT NOTE (CRITICAL VALUE NOTIFICATION) - ACTION/TREATMENT ORDERED:
Normal for race
hold heparin for 1 hr and will resume at a lower rate of 9ml/hr as per protocol. bleeding precautions maintained
hold heparin drip 1 h , restart heparin drip @ 11cc/h @ 0500